# Patient Record
Sex: FEMALE | Race: WHITE | Employment: OTHER | URBAN - METROPOLITAN AREA
[De-identification: names, ages, dates, MRNs, and addresses within clinical notes are randomized per-mention and may not be internally consistent; named-entity substitution may affect disease eponyms.]

---

## 2017-06-28 ENCOUNTER — TELEPHONE (OUTPATIENT)
Dept: FAMILY MEDICINE CLINIC | Age: 52
End: 2017-06-28

## 2017-06-28 DIAGNOSIS — M54.17 THORACIC AND LUMBOSACRAL NEURITIS: Primary | ICD-10-CM

## 2017-06-28 DIAGNOSIS — M54.14 THORACIC AND LUMBOSACRAL NEURITIS: Primary | ICD-10-CM

## 2017-06-30 ENCOUNTER — TELEPHONE (OUTPATIENT)
Dept: INTERNAL MEDICINE CLINIC | Age: 52
End: 2017-06-30

## 2017-08-15 ENCOUNTER — OFFICE VISIT (OUTPATIENT)
Dept: RHEUMATOLOGY | Age: 52
End: 2017-08-15

## 2017-08-15 ENCOUNTER — HOSPITAL ENCOUNTER (OUTPATIENT)
Dept: NON INVASIVE DIAGNOSTICS | Age: 52
Discharge: HOME | End: 2017-08-15
Attending: INTERNAL MEDICINE | Admitting: INTERNAL MEDICINE

## 2017-08-15 VITALS
SYSTOLIC BLOOD PRESSURE: 122 MMHG | HEIGHT: 60 IN | DIASTOLIC BLOOD PRESSURE: 79 MMHG | HEART RATE: 72 BPM | OXYGEN SATURATION: 94 %

## 2017-08-15 DIAGNOSIS — M79.18 MUSCULOSKELETAL PAIN: ICD-10-CM

## 2017-08-15 DIAGNOSIS — M25.50 POLYARTHRALGIA: Primary | ICD-10-CM

## 2017-08-15 DIAGNOSIS — M25.50 POLYARTHRALGIA: ICD-10-CM

## 2017-08-15 LAB
A/G RATIO: 1.3 (ref 1.1–2.2)
ALBUMIN SERPL-MCNC: 4.4 G/DL (ref 3.4–5)
ALP BLD-CCNC: 116 U/L (ref 40–129)
ALT SERPL-CCNC: 35 U/L (ref 10–40)
ANION GAP SERPL CALCULATED.3IONS-SCNC: 16 MMOL/L (ref 3–16)
AST SERPL-CCNC: 25 U/L (ref 15–37)
BASOPHILS ABSOLUTE: 0.1 K/UL (ref 0–0.2)
BASOPHILS RELATIVE PERCENT: 1.9 %
BILIRUB SERPL-MCNC: 0.3 MG/DL (ref 0–1)
BUN BLDV-MCNC: 9 MG/DL (ref 7–20)
C-REACTIVE PROTEIN: 6.6 MG/L (ref 0–5.1)
CALCIUM SERPL-MCNC: 9.4 MG/DL (ref 8.3–10.6)
CHLORIDE BLD-SCNC: 103 MMOL/L (ref 99–110)
CO2: 24 MMOL/L (ref 21–32)
CREAT SERPL-MCNC: 0.6 MG/DL (ref 0.6–1.1)
EOSINOPHILS ABSOLUTE: 0.2 K/UL (ref 0–0.6)
EOSINOPHILS RELATIVE PERCENT: 3.4 %
GFR AFRICAN AMERICAN: >60
GFR NON-AFRICAN AMERICAN: >60
GLOBULIN: 3.3 G/DL
GLUCOSE BLD-MCNC: 103 MG/DL (ref 70–99)
HCT VFR BLD CALC: 45.5 % (ref 36–48)
HEMOGLOBIN: 15.4 G/DL (ref 12–16)
HEPATITIS B CORE IGM ANTIBODY: NORMAL
HEPATITIS B SURFACE ANTIGEN INTERPRETATION: NORMAL
HEPATITIS C ANTIBODY INTERPRETATION: NORMAL
LYMPHOCYTES ABSOLUTE: 1.9 K/UL (ref 1–5.1)
LYMPHOCYTES RELATIVE PERCENT: 36.4 %
MCH RBC QN AUTO: 30.1 PG (ref 26–34)
MCHC RBC AUTO-ENTMCNC: 33.8 G/DL (ref 31–36)
MCV RBC AUTO: 89 FL (ref 80–100)
MONOCYTES ABSOLUTE: 0.5 K/UL (ref 0–1.3)
MONOCYTES RELATIVE PERCENT: 9.2 %
NEUTROPHILS ABSOLUTE: 2.5 K/UL (ref 1.7–7.7)
NEUTROPHILS RELATIVE PERCENT: 49.1 %
PDW BLD-RTO: 12.9 % (ref 12.4–15.4)
PLATELET # BLD: 325 K/UL (ref 135–450)
PMV BLD AUTO: 8.3 FL (ref 5–10.5)
POTASSIUM SERPL-SCNC: 4.2 MMOL/L (ref 3.5–5.1)
RBC # BLD: 5.11 M/UL (ref 4–5.2)
RHEUMATOID FACTOR: 165 IU/ML
SODIUM BLD-SCNC: 143 MMOL/L (ref 136–145)
TOTAL CK: 97 U/L (ref 26–192)
TOTAL PROTEIN: 7.7 G/DL (ref 6.4–8.2)
TSH REFLEX FT4: 1.66 UIU/ML (ref 0.27–4.2)
VITAMIN B-12: 437 PG/ML (ref 211–911)
VITAMIN D 25-HYDROXY: 37.9 NG/ML
WBC # BLD: 5.1 K/UL (ref 4–11)

## 2017-08-15 PROCEDURE — 99244 OFF/OP CNSLTJ NEW/EST MOD 40: CPT | Performed by: INTERNAL MEDICINE

## 2017-08-15 RX ORDER — PREDNISONE 1 MG/1
5 TABLET ORAL 2 TIMES DAILY
Qty: 42 TABLET | Refills: 0 | Status: SHIPPED | OUTPATIENT
Start: 2017-08-15 | End: 2017-09-05

## 2017-08-15 ASSESSMENT — ENCOUNTER SYMPTOMS
SORE THROAT: 0
PHOTOPHOBIA: 0
STRIDOR: 0
EYE PAIN: 0
EYE REDNESS: 0
GASTROINTESTINAL NEGATIVE: 1
EYE DISCHARGE: 0
SHORTNESS OF BREATH: 1

## 2017-08-17 ENCOUNTER — TELEPHONE (OUTPATIENT)
Dept: INTERNAL MEDICINE CLINIC | Age: 52
End: 2017-08-17

## 2017-08-17 LAB
ANA BY IFA: ABNORMAL
CCP IGG ANTIBODIES: 190 UNITS (ref 0–19)

## 2017-08-21 ENCOUNTER — TELEPHONE (OUTPATIENT)
Dept: RHEUMATOLOGY | Age: 52
End: 2017-08-21

## 2017-09-05 ENCOUNTER — OFFICE VISIT (OUTPATIENT)
Dept: RHEUMATOLOGY | Age: 52
End: 2017-09-05

## 2017-09-05 VITALS
HEIGHT: 60 IN | BODY MASS INDEX: 32.79 KG/M2 | DIASTOLIC BLOOD PRESSURE: 88 MMHG | WEIGHT: 167 LBS | SYSTOLIC BLOOD PRESSURE: 130 MMHG

## 2017-09-05 DIAGNOSIS — M05.79 RHEUMATOID ARTHRITIS INVOLVING MULTIPLE SITES WITH POSITIVE RHEUMATOID FACTOR (HCC): Primary | ICD-10-CM

## 2017-09-05 LAB
ALT SERPL-CCNC: 37 U/L (ref 10–40)
AST SERPL-CCNC: 28 U/L (ref 15–37)
BASOPHILS ABSOLUTE: 0.1 K/UL (ref 0–0.2)
BASOPHILS RELATIVE PERCENT: 1.5 %
CREAT SERPL-MCNC: 0.6 MG/DL (ref 0.6–1.1)
EOSINOPHILS ABSOLUTE: 0.2 K/UL (ref 0–0.6)
EOSINOPHILS RELATIVE PERCENT: 3.8 %
GFR AFRICAN AMERICAN: >60
GFR NON-AFRICAN AMERICAN: >60
HCT VFR BLD CALC: 42.4 % (ref 36–48)
HEMOGLOBIN: 14.3 G/DL (ref 12–16)
LYMPHOCYTES ABSOLUTE: 2.1 K/UL (ref 1–5.1)
LYMPHOCYTES RELATIVE PERCENT: 45 %
MCH RBC QN AUTO: 30.2 PG (ref 26–34)
MCHC RBC AUTO-ENTMCNC: 33.7 G/DL (ref 31–36)
MCV RBC AUTO: 89.6 FL (ref 80–100)
MONOCYTES ABSOLUTE: 0.4 K/UL (ref 0–1.3)
MONOCYTES RELATIVE PERCENT: 8.1 %
NEUTROPHILS ABSOLUTE: 1.9 K/UL (ref 1.7–7.7)
NEUTROPHILS RELATIVE PERCENT: 41.6 %
PDW BLD-RTO: 13.1 % (ref 12.4–15.4)
PLATELET # BLD: 298 K/UL (ref 135–450)
PLATELET SLIDE REVIEW: ADEQUATE
PMV BLD AUTO: 9.1 FL (ref 5–10.5)
RBC # BLD: 4.73 M/UL (ref 4–5.2)
WBC # BLD: 4.7 K/UL (ref 4–11)

## 2017-09-05 PROCEDURE — 99214 OFFICE O/P EST MOD 30 MIN: CPT | Performed by: INTERNAL MEDICINE

## 2017-09-05 RX ORDER — FOLIC ACID 1 MG/1
1 TABLET ORAL DAILY
Qty: 30 TABLET | Refills: 5 | Status: SHIPPED | OUTPATIENT
Start: 2017-09-05 | End: 2017-11-17 | Stop reason: ALTCHOICE

## 2017-09-05 RX ORDER — METHYLPREDNISOLONE 4 MG/1
TABLET ORAL
Qty: 75 TABLET | Refills: 0 | Status: SHIPPED | OUTPATIENT
Start: 2017-09-05 | End: 2017-11-17 | Stop reason: ALTCHOICE

## 2017-09-05 ASSESSMENT — ENCOUNTER SYMPTOMS
STRIDOR: 0
GASTROINTESTINAL NEGATIVE: 1
EYE PAIN: 0
EYE REDNESS: 0
PHOTOPHOBIA: 0
EYE DISCHARGE: 0
SORE THROAT: 0
SHORTNESS OF BREATH: 1

## 2017-11-17 ENCOUNTER — OFFICE VISIT (OUTPATIENT)
Dept: FAMILY MEDICINE CLINIC | Age: 52
End: 2017-11-17

## 2017-11-17 VITALS
HEIGHT: 60 IN | TEMPERATURE: 98 F | BODY MASS INDEX: 32.98 KG/M2 | SYSTOLIC BLOOD PRESSURE: 124 MMHG | DIASTOLIC BLOOD PRESSURE: 84 MMHG | WEIGHT: 168 LBS

## 2017-11-17 DIAGNOSIS — J01.10 ACUTE FRONTAL SINUSITIS, RECURRENCE NOT SPECIFIED: Primary | ICD-10-CM

## 2017-11-17 PROCEDURE — 3014F SCREEN MAMMO DOC REV: CPT | Performed by: INTERNAL MEDICINE

## 2017-11-17 PROCEDURE — G8484 FLU IMMUNIZE NO ADMIN: HCPCS | Performed by: INTERNAL MEDICINE

## 2017-11-17 PROCEDURE — 99213 OFFICE O/P EST LOW 20 MIN: CPT | Performed by: INTERNAL MEDICINE

## 2017-11-17 PROCEDURE — G8427 DOCREV CUR MEDS BY ELIG CLIN: HCPCS | Performed by: INTERNAL MEDICINE

## 2017-11-17 PROCEDURE — 3017F COLORECTAL CA SCREEN DOC REV: CPT | Performed by: INTERNAL MEDICINE

## 2017-11-17 PROCEDURE — G8417 CALC BMI ABV UP PARAM F/U: HCPCS | Performed by: INTERNAL MEDICINE

## 2017-11-17 PROCEDURE — 4004F PT TOBACCO SCREEN RCVD TLK: CPT | Performed by: INTERNAL MEDICINE

## 2017-11-17 RX ORDER — AZITHROMYCIN 250 MG/1
TABLET, FILM COATED ORAL
Qty: 1 PACKET | Refills: 0 | Status: SHIPPED | OUTPATIENT
Start: 2017-11-17 | End: 2017-11-27

## 2017-11-17 ASSESSMENT — ENCOUNTER SYMPTOMS
SHORTNESS OF BREATH: 0
COUGH: 1
SINUS PRESSURE: 1
APNEA: 0
RHINORRHEA: 1
ABDOMINAL PAIN: 0

## 2017-11-17 NOTE — PROGRESS NOTES
Subjective:      Patient ID: Brenda Payne is a 47 y.o. female. HPI   Chief Complaint   Patient presents with    URI     patient c/o head congestion, cough- productive at times, tightness in chest x 3 days; slight sore throat. patient denies fever     Brenda Payne is a 47 y.o. female with the following history as recorded in [facility]:  Patient Active Problem List    Diagnosis Date Noted    Pink eye disease of both eyes 2016    Essential hypertension 2016    Sinusitis 2015    Foot pain, bilateral 2015    Memory loss 2015    Mixed anxiety depressive disorder 2014    Thoracic and lumbosacral neuritis 2010     Current Outpatient Prescriptions   Medication Sig Dispense Refill    methylPREDNISolone (MEDROL) 4 MG tablet Take 3 tabs/day for 1 week,2.5 tabs/day for 1 week,2 tab/day for 1 week,1.5 tab/day for 1 week,1 tab/day for 1 week,0.5 tab/day for 1 week 75 tablet 0    methotrexate (RHEUMATREX) 2.5 MG chemo tablet Take 4 pills/week for 2 weeks,  increase to 6 pills/week for 2 weeks and then 8 pills/week thereafter. Take all pills at the same time 40 tablet 1    folic acid (FOLVITE) 1 MG tablet Take 1 tablet by mouth daily 30 tablet 5    ibuprofen (ADVIL;MOTRIN) 600 MG tablet Take 1 tablet by mouth every 6 hours as needed for Pain 60 tablet 0     No current facility-administered medications for this visit. Allergies: Fluoxetine; Hydrocodone-acetaminophen; Penicillins; Serotonin reuptake inhibitors (ssris);  Venlafaxine; Clindamycin/lincomycin; and Keflex [cephalexin]  Past Medical History:   Diagnosis Date    Rheumatoid arthritis ([facility] [address]      Past Surgical History:   Procedure Laterality Date     SECTION      GALLBLADDER SURGERY      HYSTERECTOMY      TUBAL LIGATION       Family History   Problem Relation Age of Onset    High Blood Pressure Mother     Other Mother      thyroid    Heart Disease Mother      Social History   Substance Use Topics    Smoking status: Current Every Day Smoker     Packs/day: 0.50     Years: 30.00     Types: Cigarettes    Smokeless tobacco: Never Used    Alcohol use No       Review of Systems   HENT: Positive for congestion, postnasal drip, rhinorrhea and sinus pressure. Eyes: Negative for visual disturbance. Respiratory: Positive for cough. Negative for apnea and shortness of breath. Cardiovascular: Negative for chest pain and palpitations. Gastrointestinal: Negative for abdominal pain. Genitourinary: Negative for dysuria and frequency. Objective:   Physical Exam   Constitutional: She is oriented to person, place, and time. She appears well-developed. HENT:   Edema bilat. nasal turbinates with drainage . Cardiovascular: Normal rate. No murmur heard. Pulmonary/Chest: Effort normal and breath sounds normal. No respiratory distress. She has no wheezes. Abdominal: She exhibits no distension. There is no tenderness. Neurological: She is alert and oriented to person, place, and time.        Assessment:      sinusitis      Plan:      [facility]

## 2018-09-18 ENCOUNTER — HOSPITAL ENCOUNTER (OUTPATIENT)
Dept: NON INVASIVE DIAGNOSTICS | Age: 53
Discharge: HOME | End: 2018-09-18

## 2018-09-18 DIAGNOSIS — R06.02 SHORTNESS OF BREATH: ICD-10-CM

## 2018-11-30 ENCOUNTER — HOSPITAL ENCOUNTER (OUTPATIENT)
Dept: GENERAL RADIOLOGY | Age: 53
Discharge: HOME | End: 2018-11-30
Payer: COMMERCIAL

## 2018-11-30 ENCOUNTER — HOSPITAL ENCOUNTER (OUTPATIENT)
Age: 53
Discharge: HOME | End: 2018-11-30
Payer: COMMERCIAL

## 2018-11-30 DIAGNOSIS — R59.0 BILATERAL HILAR ADENOPATHY SYNDROME: ICD-10-CM

## 2018-11-30 PROCEDURE — 71046 X-RAY EXAM CHEST 2 VIEWS: CPT

## 2019-02-13 ENCOUNTER — HOSPITAL ENCOUNTER (OUTPATIENT)
Dept: NON INVASIVE DIAGNOSTICS | Age: 54
Discharge: HOME | End: 2019-02-13
Payer: COMMERCIAL

## 2019-02-13 LAB
LV EF: 63 %
LVEF MODALITY: NORMAL

## 2019-02-13 PROCEDURE — 93306 TTE W/DOPPLER COMPLETE: CPT

## 2019-05-01 ENCOUNTER — HOSPITAL ENCOUNTER (OUTPATIENT)
Dept: CT IMAGING | Age: 54
Discharge: HOME | End: 2019-05-01
Payer: MEDICARE

## 2019-05-01 DIAGNOSIS — N20.0 CALCULUS OF KIDNEY: ICD-10-CM

## 2019-05-01 PROCEDURE — 74176 CT ABD & PELVIS W/O CONTRAST: CPT

## 2019-07-31 ENCOUNTER — HOSPITAL ENCOUNTER (EMERGENCY)
Age: 54
Discharge: HOME | End: 2019-07-31
Attending: EMERGENCY MEDICINE
Payer: MEDICARE

## 2019-07-31 ENCOUNTER — APPOINTMENT (OUTPATIENT)
Dept: GENERAL RADIOLOGY | Age: 54
End: 2019-07-31
Payer: MEDICARE

## 2019-07-31 VITALS
BODY MASS INDEX: 39.69 KG/M2 | DIASTOLIC BLOOD PRESSURE: 83 MMHG | OXYGEN SATURATION: 100 % | RESPIRATION RATE: 20 BRPM | SYSTOLIC BLOOD PRESSURE: 134 MMHG | TEMPERATURE: 98 F | HEART RATE: 95 BPM | WEIGHT: 202.16 LBS | HEIGHT: 60 IN

## 2019-07-31 DIAGNOSIS — S20.211A CONTUSION OF RIGHT CHEST WALL, INITIAL ENCOUNTER: Primary | ICD-10-CM

## 2019-07-31 PROCEDURE — 99283 EMERGENCY DEPT VISIT LOW MDM: CPT

## 2019-07-31 PROCEDURE — 71101 X-RAY EXAM UNILAT RIBS/CHEST: CPT

## 2019-07-31 RX ORDER — HYDROCHLOROTHIAZIDE 25 MG/1
25 TABLET ORAL
COMMUNITY
Start: 2019-01-18 | End: 2021-03-07

## 2019-07-31 RX ORDER — FLUTICASONE PROPIONATE 50 MCG
2 SPRAY, SUSPENSION (ML) NASAL DAILY PRN
COMMUNITY
Start: 2018-10-23

## 2019-07-31 RX ORDER — TIOTROPIUM BROMIDE INHALATION SPRAY 3.12 UG/1
2 SPRAY, METERED RESPIRATORY (INHALATION) DAILY PRN
Refills: 0 | COMMUNITY
Start: 2019-05-31

## 2019-07-31 RX ORDER — AMLODIPINE BESYLATE 5 MG/1
5 TABLET ORAL
COMMUNITY
Start: 2019-01-18 | End: 2021-03-07

## 2019-07-31 RX ORDER — ALBUTEROL SULFATE 90 UG/1
2 AEROSOL, METERED RESPIRATORY (INHALATION) EVERY 4 HOURS PRN
COMMUNITY
Start: 2018-05-20

## 2019-07-31 RX ORDER — OXYCODONE HYDROCHLORIDE 5 MG/1
5 TABLET ORAL EVERY 6 HOURS PRN
Qty: 12 TABLET | Refills: 0 | Status: SHIPPED | OUTPATIENT
Start: 2019-07-31 | End: 2019-08-03

## 2019-07-31 RX ORDER — HYDROXYZINE PAMOATE 25 MG/1
25 CAPSULE ORAL
COMMUNITY
Start: 2018-04-24 | End: 2021-03-07

## 2019-07-31 ASSESSMENT — PAIN DESCRIPTION - LOCATION
LOCATION: RIB CAGE
LOCATION: RIB CAGE

## 2019-07-31 ASSESSMENT — PAIN - FUNCTIONAL ASSESSMENT
PAIN_FUNCTIONAL_ASSESSMENT: PREVENTS OR INTERFERES SOME ACTIVE ACTIVITIES AND ADLS
PAIN_FUNCTIONAL_ASSESSMENT: PREVENTS OR INTERFERES SOME ACTIVE ACTIVITIES AND ADLS

## 2019-07-31 ASSESSMENT — PAIN DESCRIPTION - ORIENTATION
ORIENTATION: RIGHT
ORIENTATION: RIGHT

## 2019-07-31 ASSESSMENT — PAIN SCALES - GENERAL
PAINLEVEL_OUTOF10: 8
PAINLEVEL_OUTOF10: 9

## 2019-07-31 ASSESSMENT — PAIN DESCRIPTION - ONSET
ONSET: AWAKENED FROM SLEEP
ONSET: AWAKENED FROM SLEEP

## 2019-07-31 ASSESSMENT — PAIN DESCRIPTION - FREQUENCY
FREQUENCY: CONTINUOUS
FREQUENCY: CONTINUOUS

## 2019-07-31 ASSESSMENT — PAIN DESCRIPTION - PAIN TYPE
TYPE: ACUTE PAIN
TYPE: ACUTE PAIN

## 2019-07-31 ASSESSMENT — PAIN DESCRIPTION - DESCRIPTORS
DESCRIPTORS: ACHING
DESCRIPTORS: ACHING

## 2019-07-31 NOTE — ED PROVIDER NOTES
[facility]  eMERGENCY dEPARTMENT eNCOUnter      Pt Name: Constantine Myers  MRN: [id number]  [place] 1965  Date of evaluation: 7/31/2019  Provider: Sheri Robles MD    [address] [place] [address]       Chief Complaint   Patient presents with    Chest Pain     rib pain right side after a fall on Sunday at [employer]. Patient stated tried to catch herself and hurt right wrist, but wrist feels better just under breast and rib area. No LOC         HISTORY OF PRESENT ILLNESS  (Location/Symptom, Timing/Onset, Context/Setting, Quality, Duration, Modifying Factors, Severity.)   Constantine Myers is a 49 y.o. female who presents to the emergency department running of right rib pain after a fall on Sunday. She fell on the chest steps at [employer]. She did not hit her head. She denies any neck or back pain. She states the pain is primarily underneath her right breast.  It seemed to be worse today. She had an abrasion on her right knee. She had some soreness in her left leg. These have resolved. No head injury. No loss of consciousness. No shortness of breath or hemoptysis. No abdominal pain. Nursing Notes were reviewed and I agree. REVIEW OF SYSTEMS    (2-9 systems for level 4, 10 or more for level 5)     HEENT no head or facial injury. Cardiovascular: Right anterior rib pain beneath her right breast.  Worse with movement breathing. Pulmonary: No shortness of breath hemoptysis or cough. GI: No abdominal pain nausea vomiting. Musculoskeletal: Some left lower leg pain initially after the fall but none at this time. No back pain. Skin: An abrasion over her right knee which she states is resolved. Neuro: No head injury. No extremity numbness weakness or paresthesias. Skin: Abrasion to right knee which is resolved. Except as noted above the remainder of the review of systems was reviewed and negative.        PAST MEDICAL HISTORY         Diagnosis Date    COPD (chronic obstructive erythema. No facial trauma. Neck: Supple, nontender, no adenopathy. Chest wall: Diffuse right mid anterior lateral chest wall tenderness without crepitance. Heart: Regular rate and rhythm. No murmurs or gallops noted. Lungs: Breath sounds equal bilaterally and clear. Abdomen: Obese, soft, nontender. Musculoskeletal: No upper or lower extremity bony tenderness. No joint swelling. No back tenderness. Skin: No bruising. A minimal healing abrasion over the anterior right knee without erythema. Neuro: Awake, alert, oriented x3. Symmetrical reactive pupils. Intact extraocular movements. No facial asymmetry. Midline tongue. Symmetrical motor function. Normal gait. DIAGNOSTIC RESULTS     RADIOLOGY:   Non-plain film images such as CT, Ultrasound and MRI are read by the radiologist. Plain radiographic images are visualized and preliminarily interpreted by Emily Quintanilla MD with the below findings:      Interpretation per the Radiologist below, if available at the time of this note:    XR RIBS RIGHT INCLUDE CHEST (MIN 3 VIEWS)   Preliminary Result   No acute cardiopulmonary process. No displaced right rib fracture. LABS:  Labs Reviewed - No data to display    All other labs were within normal range or not returned as of this dictation. EMERGENCY DEPARTMENT COURSE and DIFFERENTIAL DIAGNOSIS/MDM:   Vitals:    Vitals:    07/31/19 1452   BP: 134/83   Pulse: 95   Resp: 20   Temp: 98 °F (36.7 °C)   TempSrc: Oral   SpO2: 100%   Weight: 202 lb 2.6 oz (91.7 kg)   Height: 5' (1.524 m)       The patient sustained a fall on Sunday and injured her right rib area. She is had increasing pain. No shortness of breath or hemoptysis. She has no crepitance on exam.  No bruising. Her tenderness is localized to her right anterior mid chest in the area of her breasts. Chest x-ray shows no evidence of rib fracture, pulmonary contusion, pneumothorax.   She is limited due to her liver disease and inability to take acetaminophen and NSAIDs. I am going to provide short-term pain control in the form of oxycodone for 3 days. I am going to have her follow-up with her primary care physician. X-ray results, treatment plan, and follow-up were discussed with the patient. She understands treatment plan and will follow-up as discussed. Controlled Substance Monitoring:    Acute and Chronic Pain Monitoring:   RX Monitoring 7/31/2019   Periodic Controlled Substance Monitoring Possible medication side effects, risk of tolerance/dependence & alternative treatments discussed. ;No signs of potential drug abuse or diversion identified. PROCEDURES:  None    FINAL IMPRESSION      1. Contusion of right chest wall, initial encounter          DISPOSITION/PLAN   DISPOSITION Decision To Discharge 07/31/2019 03:26:20 PM      PATIENT REFERRED TO:  JEB Howard - CNP    [facility]  [phone]    In 5 days        DISCHARGE MEDICATIONS:  New Prescriptions    OXYCODONE (ROXICODONE) 5 MG IMMEDIATE RELEASE TABLET    Take 1 tablet by mouth every 6 hours as needed for Pain for up to 3 days. Intended supply: 3 days.  Take lowest dose possible to manage pain       (Please note that portions of this note were completed with a voice recognition program.  Efforts were made to edit the dictations but occasionally words are mis-transcribed.)    Siddharth Flores MD  Attending Emergency Physician        Malaika Josue MD  07/31/19 1994

## 2019-07-31 NOTE — ED NOTES
Discharge instructions reviewed. Patient verbalized understanding. Patient given a incentive spirometer to help with deep breaths.        Lili Santoyo RN  07/31/19 8680

## 2019-08-29 ENCOUNTER — HOSPITAL ENCOUNTER (EMERGENCY)
Age: 54
Discharge: HOME | End: 2019-08-29
Attending: EMERGENCY MEDICINE
Payer: MEDICARE

## 2019-08-29 VITALS
BODY MASS INDEX: 40.21 KG/M2 | OXYGEN SATURATION: 95 % | HEART RATE: 85 BPM | TEMPERATURE: 97.4 F | WEIGHT: 204.81 LBS | HEIGHT: 60 IN | RESPIRATION RATE: 18 BRPM | SYSTOLIC BLOOD PRESSURE: 132 MMHG | DIASTOLIC BLOOD PRESSURE: 92 MMHG

## 2019-08-29 DIAGNOSIS — M77.8 TENDONITIS OF BOTH WRISTS: Primary | ICD-10-CM

## 2019-08-29 PROCEDURE — 99283 EMERGENCY DEPT VISIT LOW MDM: CPT

## 2019-08-29 RX ORDER — PREDNISONE 20 MG/1
TABLET ORAL
Qty: 15 TABLET | Refills: 0 | Status: SHIPPED | OUTPATIENT
Start: 2019-08-29 | End: 2021-03-07

## 2019-08-29 ASSESSMENT — ENCOUNTER SYMPTOMS
VOMITING: 0
ABDOMINAL PAIN: 0
DIARRHEA: 0
NAUSEA: 0
EYE DISCHARGE: 0
SORE THROAT: 0
RHINORRHEA: 0
BACK PAIN: 0
WHEEZING: 0
COUGH: 0
SHORTNESS OF BREATH: 0
EYE PAIN: 0

## 2019-08-29 ASSESSMENT — PAIN SCALES - GENERAL
PAINLEVEL_OUTOF10: 8
PAINLEVEL_OUTOF10: 8
PAINLEVEL_OUTOF10: 10

## 2019-08-29 ASSESSMENT — PAIN DESCRIPTION - DESCRIPTORS
DESCRIPTORS: CONSTANT
DESCRIPTORS: CONSTANT

## 2019-08-29 ASSESSMENT — PAIN DESCRIPTION - PROGRESSION
CLINICAL_PROGRESSION: GRADUALLY WORSENING
CLINICAL_PROGRESSION: GRADUALLY IMPROVING

## 2019-08-29 ASSESSMENT — PAIN DESCRIPTION - ORIENTATION
ORIENTATION: LEFT;RIGHT
ORIENTATION: LEFT;RIGHT

## 2019-08-29 ASSESSMENT — PAIN DESCRIPTION - PAIN TYPE: TYPE: ACUTE PAIN

## 2019-08-29 ASSESSMENT — PAIN DESCRIPTION - FREQUENCY
FREQUENCY: CONTINUOUS
FREQUENCY: CONTINUOUS

## 2019-08-29 ASSESSMENT — PAIN SCALES - WONG BAKER: WONGBAKER_NUMERICALRESPONSE: 0

## 2019-08-29 ASSESSMENT — PAIN DESCRIPTION - LOCATION
LOCATION: HAND
LOCATION: HAND

## 2019-08-29 NOTE — ED PROVIDER NOTES
[facility]  eMERGENCY dEPARTMENT eNCOUnter        Pt Name: Linda Jonas  MRN: [id number]  [place] 1965  Date of evaluation: 8/29/2019  Provider: Alecia Page MD  PCP: JEB Tyson - CNP      CHIEF COMPLAINT       Chief Complaint   Patient presents with    Hand Pain     Bilateral hand pain and swelling for over one week. History of RA. HISTORY OFPRESENT ILLNESS   (Location/Symptom, Timing/Onset, Context/Setting, Quality, Duration, Modifying Factors,Severity)  Note limiting factors. Linda Jonas is a 49 y.o. female       Location/Symptom: Bilateral hand pain and swelling  Timing/Onset: 1 week  Context/Setting: Patient has a history of rheumatoid arthritis. She had been on sulfasalazine and did quite well with that but developed nonalcoholic steatohepatitis so that had to be stopped. She is currently on Humira but really has been helping her. Quality: Burning and sharp  Duration: Constant  Modifying Factors: Doing anything with her hands or wrist.  In fact her  had to help her get dressed. Severity: 10 out of 10    Nursing Noteswere all reviewed and agreed with or any disagreements were addressed  in the HPI. REVIEW OF SYSTEMS    (2-9 systems for level 4, 10 or more for level 5)     Review of Systems   Constitutional: Negative for chills, fatigue and fever. HENT: Negative for ear pain, rhinorrhea and sore throat. Eyes: Negative for pain, discharge and visual disturbance. Respiratory: Negative for cough, shortness of breath and wheezing. Cardiovascular: Negative for chest pain, palpitations and leg swelling. Gastrointestinal: Negative for abdominal pain, diarrhea, nausea and vomiting. Genitourinary: Negative for difficulty urinating, dysuria, pelvic pain and vaginal discharge. Musculoskeletal: Positive for arthralgias. Negative for back pain, joint swelling and neck pain. Positive per HPI   Skin: Negative for rash. Allergic/Immunologic: Negative for environmental allergies. Neurological: Negative for dizziness, seizures, syncope and headaches. Hematological: Negative for adenopathy. Psychiatric/Behavioral: Negative for dysphoric mood and suicidal ideas. The patient is not nervous/anxious. PAST MEDICAL HISTORY     Past Medical History:   Diagnosis Date    COPD (chronic obstructive pulmonary disease) ([facility] [address]     GARCIA (nonalcoholic steatohepatitis)     [facility] ([facility]          SURGICAL HISTORY     Past Surgical History:   Procedure Laterality Date     SECTION      GALLBLADDER SURGERY      HYSTERECTOMY      TUBAL LIGATION           CURRENTMEDICATIONS       Previous Medications    ADALIMUMAB 40 MG/0.4ML PNKT    Inject 40 mg into the skin    ALBUTEROL SULFATE  (90 BASE) MCG/ACT INHALER        AMLODIPINE (NORVASC) 5 MG TABLET    Take 5 mg by mouth    FLUTICASONE (FLONASE) 50 MCG/ACT NASAL SPRAY    SPRAY 2 SPRAY(S) EVERY DAY BY INTRANASAL ROUTE AS NEEDED. HYDROCHLOROTHIAZIDE (HYDRODIURIL) 25 MG TABLET    Take 25 mg by mouth    HYDROXYZINE (VISTARIL) 25 MG CAPSULE    Take 25 mg by mouth    IBUPROFEN (ADVIL;MOTRIN) 600 MG TABLET    Take 1 tablet by mouth every 6 hours as needed for Pain    OMEGA-3 FATTY ACIDS (FISH OIL PO)    Take by mouth    SPIRIVA RESPIMAT 2.5 MCG/ACT AERS INHALER    TAKE 2 PUFFS BY MOUTH EVERY DAY       ALLERGIES     Fluoxetine; Hydrocodone-acetaminophen; Penicillins; Serotonin reuptake inhibitors (ssris);  Venlafaxine; Clindamycin/lincomycin; and Keflex [cephalexin]    FAMILY HISTORY       Family History   Problem Relation Age of Onset    High Blood Pressure Mother     Other Mother         thyroid    Heart Disease Mother           SOCIAL HISTORY       Social History     Socioeconomic History    Marital status:      Spouse name: None    Number of children: None    Years of education: None    Highest education level: None   Occupational History    None

## 2020-11-17 ENCOUNTER — HOSPITAL ENCOUNTER (OUTPATIENT)
Dept: PHYSICAL THERAPY | Age: 55
Setting detail: THERAPIES SERIES
Discharge: HOME | End: 2020-11-17
Payer: MEDICARE

## 2020-11-17 NOTE — FLOWSHEET NOTE
Physical Therapy  Cancellation/No-show Note  Patient Name:  Ezekiel Pretty  :  1965   Date:  2020  Cancelled visits to date: 1  No-shows to date: 0    For today's appointment patient:  [x]  Cancelled  [x]  Rescheduled appointment  []  No-show     Reason given by patient:  [x]  Patient ill  []  Conflicting appointment  []  No transportation    []  Conflict with work  []  No reason given  []  Other:     Comments:  20:  Patient called, cancelled PT evaluation this am due to not feeling well. Patient rescheduled.     Electronically signed by:  Beltran Fermin, [address] [place] [address],[facility]

## 2020-11-19 ENCOUNTER — HOSPITAL ENCOUNTER (OUTPATIENT)
Dept: PHYSICAL THERAPY | Age: 55
Setting detail: THERAPIES SERIES
Discharge: HOME | End: 2020-11-19
Payer: MEDICARE

## 2020-11-25 ENCOUNTER — HOSPITAL ENCOUNTER (OUTPATIENT)
Dept: GENERAL RADIOLOGY | Age: 55
Discharge: HOME | End: 2020-11-25
Payer: MEDICARE

## 2020-11-25 ENCOUNTER — HOSPITAL ENCOUNTER (OUTPATIENT)
Age: 55
Discharge: HOME | End: 2020-11-25
Payer: MEDICARE

## 2020-11-25 PROCEDURE — 74018 RADEX ABDOMEN 1 VIEW: CPT

## 2021-01-15 ENCOUNTER — HOSPITAL ENCOUNTER (OUTPATIENT)
Dept: PHYSICAL THERAPY | Age: 56
Setting detail: THERAPIES SERIES
Discharge: HOME | End: 2021-01-15
Payer: MEDICARE

## 2021-01-15 PROCEDURE — 97110 THERAPEUTIC EXERCISES: CPT

## 2021-01-15 PROCEDURE — 97161 PT EVAL LOW COMPLEX 20 MIN: CPT

## 2021-01-15 NOTE — FLOWSHEET NOTE
[facility] - Outpatient Rehabilitation and Therapy, [place] [address]  Phone: [phone]   Fax:     [phone]      Physical Therapy Treatment Note/ Progress Report:     Date:  1/15/2021    Patient Name:  Megan Bradford    :  1965  MRN: [id number]    Pertinent Medical History:     Medical/Treatment Diagnosis Information:  · Diagnosis: Osteoarthris of B Knees M17.9  · Treatment Diagnosis: Decreased functional mobility    Insurance/Certification information:  PT Insurance Information: [employer], allowed 30 PT/OT/ST visits per calendar year, per Shakeel White Ref #[id number]  Physician Information:  Referring Practitioner: Mesha Brody MD  Plan of care signed (Y/N): faxed on 1/15/21    Date of Patient follow up with Physician:      Progress Report: []  Yes  [x]  No     Date Range for reporting period:  Beginnin/15/21  Ending:      Progress report due (10 Rx/or 30 days whichever is less):     Recertification due (POC duration/ or 90 days whichever is less):      Visit # POC/Insurance Allowable Auth Needed   1 12 []Yes   []No     Latex Allergy:  [x]NO      []YES  Preferred Language for Healthcare:   [x]English       []other:  Functional Scale: WOMAC = 70 raw    Date assessed: at al    Pain level:  5-8/10 B knees     History of Injury:  Pt stated she was diagnosed with OA and her \"RA doctor did x-rays back in [facility]" and \"she said this is mild. \"  Pt cannot kneel, pt has pain with walking up steps, sitting too long, sometimes walking causes pain. Pt stated her knees \"feel like they'll lock up when I sit too long. \"  Pt had 2 PT evaluation schedule late  which pt cancelled related to pancreatitis.       SUBJECTIVE:  See eval    OBJECTIVE:   Observation:    Test measurements:    Posture: flexed knees, R LE ER     Functional Mobility/Transfers: sit to stand with use of UE     Palpation: NT     Bandages/Dressings/Incisions: NA     Gait: (include devices/WB status) decreased L arm swing, Trendelenburg  Stair climbin rails: ascended with L LE first each step, descended with R LE first each step with body rotated toward the L     ROM LEFT RIGHT   Knee ext 0 0   Knee Flex 110 122   Ankle PF [facility] WFL   Ankle DF 5 5   Ankle In [facility]   Ankle Ev [facility] WFL   Strength  LEFT RIGHT   HIP Flexors 4/5 4/5   HIP Abductors 4/5 4-/5   HIP ADD 4+/5 4/5   Hip ER 4/5 4/5   Knee EXT (quad) 4/5 4+/5   Knee Flex (HS) 5/5 5/5   Ankle DF 5/5 5/5   Ankle PF 5/5 5/5   Ankle Inv 5/ 5/5   Ankle EV 5/5 5/5         RESTRICTIONS/PRECAUTIONS: RA    Exercises/Interventions:     Therapeutic Ex (09475)   Min: Reps/Resistance Notes/CUES   Nu Step 5' Seat/arm 6   Hamstrings stretch  Gastroc incline stretch     JACOBY                         Manual Intervention (14533)  Min:     Knee PROM     Hip AAROM ABD/Flex/IR/ER     Patella Mobs                    NMR re-education (51062)  Min:  CUES NEEDED             Therapeutic Activity (70149)  Min:               Modalities  Min:     IFC with      CP after exercises     MH after exercises            Other Therapeutic Activities: Pt was educated on PT POC, Diagnosis, Prognosis, pathomechanics as well as frequency and duration of scheduling future physical therapy appointments. Time was also taken on this day to answer all patient questions and participation in PT. Reviewed appointment policy in detail with patient and patient verbalized understanding. Home Exercise Program: Patient was instructed in the following for HEP:  seated HS stretch, gastroc towel stretch, quad set, add set, SLR, access code [email or web address]. Patient verbalized/demonstrated understanding and was issued written handout.       Therapeutic Exercise and NMR EXR  [x] (67171) Provided verbal/tactile cueing for activities related to strengthening, flexibility, endurance, ROM for improvements in LE, proximal hip, and core control with self care, mobility, lifting, ambulation.  [] (15442) Provided verbal/tactile cueing for activities related to improving balance, coordination, kinesthetic sense, posture, motor skill, proprioception  to assist with LE, proximal hip, and core control in self care, mobility, lifting, ambulation and eccentric single leg control. NMR and Therapeutic Activities:    [] (48087 or 00631) Provided verbal/tactile cueing for activities related to improving balance, coordination, kinesthetic sense, posture, motor skill, proprioception and motor activation to allow for proper function of core, proximal hip and LE with self care and ADLs and functional mobility.   [] (75661) Gait Re-education- Provided training and instruction to the patient for proper LE, core and proximal hip recruitment and positioning and eccentric body weight control with ambulation re-education including up and down stairs     Home Exercise Program:    [x] (36429) Reviewed/Progressed HEP activities related to strengthening, flexibility, endurance, ROM of core, proximal hip and LE for functional self-care, mobility, lifting and ambulation/stair navigation   [] (46329)Reviewed/Progressed HEP activities related to improving balance, coordination, kinesthetic sense, posture, motor skill, proprioception of core, proximal hip and LE for self care, mobility, lifting, and ambulation/stair navigation      Manual Treatments:  PROM / STM / Oscillations-Mobs:  G-I, II, III, IV (PA's, Inf., Post.)  [] (64045) Provided manual therapy to mobilize LE, proximal hip and/or LS spine soft tissue/joints for the purpose of modulating pain, promoting relaxation,  increasing ROM, reducing/eliminating soft tissue swelling/inflammation/restriction, improving soft tissue extensibility and allowing for proper ROM for normal function with self care, mobility, lifting and ambulation.        Charges:  Timed Code Treatment Minutes: 25   Total Treatment Minutes: 45      [x] EVAL (LOW) 77862 (typically 20 minutes face-to-face)  [] EVAL (MOD) 45214 (typically 30 minutes face-to-face)  [] EVAL (HIGH) 10703 (typically 45 minutes face-to-face)  [] RE-EVAL     [x] RULA(62116) x 2    [] Dry needle 1 or 2 Muscles (48823)  [] NMR (72988) x     [] Dry needle 3+ Muscles (20708)  [] Manual (64407) x     [] Ultrasound (13137) x  [] TA (27185) x     [] Mech Traction (59140)  [] ES(attended) (20206)     [] ES (un) (60582):   [] Vasopump (87242) [] Ionto (04623)   [] Other:    GOALS:  Patient stated goal: \"To be able to function normally\"  []? Progressing: []? Met: []? Not Met: []? Adjusted     Therapist goals for Patient:   Short Term Goals: To be achieved in: 2 weeks  1. Independent in HEP and progression per patient tolerance, in order to prevent re-injury. []? Progressing: []? Met: []? Not Met: []? Adjusted  2. Patient will have a decrease in pain to facilitate improvement in movement, function, and ADLs as indicated by Functional Deficits. []? Progressing: []? Met: []? Not Met: []? Adjusted     Long Term Goals: To be achieved in: 6 weeks  1. Disability index score of 40% or less for the LEFS to assist with reaching prior level of function. []? Progressing: []? Met: []? Not Met: []? Adjusted  2. Patient will demonstrate increased AROM knee flexion to 120 B knees to allow for proper joint functioning as indicated by patients Functional Deficits. []? Progressing: []? Met: []? Not Met: []? Adjusted  3. Patient will demonstrate an increase in Strength to good proximal hip strength and control allow for proper functional mobility as indicated by patients Functional Deficits. []? Progressing: []? Met: []? Not Met: []? Adjusted  4. Patient will return to walk up and down steps without increased symptoms or restriction. []? Progressing: []? Met: []? Not Met: []? Adjusted  5. Patient will be able to play with her grand children. []? Progressing: []? Met: []? Not Met: []?  Adjusted    ASSESSMENT:  See eval    Treatment/Activity Tolerance:  [x] Patient tolerated treatment well [] Patient limited by fatique  [] Patient limited by pain  [] Patient limited by other medical complications  [] Other:     Overall Progression Towards Functional goals/ Treatment Progress Update:  [] Patient is progressing as expected towards functional goals listed. [] Progression is slowed due to complexities/Impairments listed. [] Progression has been slowed due to co-morbidities. [x] Plan just implemented, too soon to assess goals progression <30days   [] Goals require adjustment due to lack of progress  [] Patient is not progressing as expected and requires additional follow up with physician  [] Other    Prognosis for POC: [x] Good [] Fair  [] Poor    Patient requires continued skilled intervention: [x] Yes  [] No        PLAN:   [] Continue per plan of care [] Alter current plan (see comments)  [x] Plan of care initiated [] Hold pending MD visit [] Discharge    Electronically signed by: Ashlie Palma PT, OMT-C, [id number]      Note: If patient does not return for scheduled/recommended follow up visits, this note will serve as a discharge from care along with the most recent update on progress.

## 2021-01-15 NOTE — PROGRESS NOTES
[facility], [facility] [address]   Phone: [phone]   Fax:     [phone]                                                       Physical Therapy Certification    Dear Referring Practitioner: Marcos España MD,    We had the pleasure of evaluating the following patient for physical therapy services at [facility]. A summary of our findings can be found in the initial assessment below. This includes our plan of care. If you have any questions or concerns regarding these findings, please do not hesitate to contact me at the office phone number checked above. Thank you for the referral.       Physician Signature:_______________________________Date:__________________  By signing above (or electronic signature), therapists plan is approved by physician        Patient: Betty Murrieta   : 1965   MRN: [id number]  Referring Physician: Referring Practitioner: Marcos España MD      Evaluation Date: 1/15/2021      Medical Diagnosis Information:  Diagnosis: Osteoarthris of B Knees M17.9   Treatment Diagnosis: Decreased functional mobility                                         Insurance information: PT Insurance Information: [employer], allowed 30 PT/OT/ST visits per calendar year, per Jaú Ref #[id number]     Precautions/ Contra-indications:   Latex Allergy:  [x]NO      []YES  Preferred Language for Healthcare:   [x]English       []other:    C-SSRS Triggered by Intake questionnaire (Past 2 wk assessment ):   [] No, Questionnaire did not trigger screening. [x] Yes, Patient intake triggered C-SSRS Screening      [x] C-SSRS Screening completed  [] PCP notified via Epic     SUBJECTIVE: Pt stated she was diagnosed with OA and her \"RA doctor did x-rays back in [facility]" and \"she said this is mild. \"  Pt cannot kneel, pt has pain with walking up steps, sitting too long, sometimes walking causes pain. Pt stated her knees \"feel like they'll lock up when I sit too long. \"  Pt had 2 PT evaluation schedule late  which pt cancelled related to pancreatitis. Relevant Medical History:RA  Functional Scale/Score: WOMAC = 70 raw    Pain Scale: 5-8/10 anterior B knees  Easing factors: lying flat supine  Provocative factors: walking too long, sitting too long, stairs     Type: [x]Constant   []Intermittent  []Radiating []Localized []other:     Numbness/Tingling: tingling anterior shin, L > R    Occupation/School: disability- \"for all my illnesses\"    Living Status/Prior Level of Function: Independent with ADLs and IADLs    OBJECTIVE:   Posture: flexed knees, R LE ER    Functional Mobility/Transfers: sit to stand with use of UE    Palpation: NT    Bandages/Dressings/Incisions: NA    Gait: (include devices/WB status) decreased L arm swing, Trendelenburg  Stair climbin rails: ascended with L LE first each step, descended with R LE first each step with body rotated toward the L    ROM LEFT RIGHT   HIP Flex     HIP Abd     HIP Ext     HIP IR     HIP ER     Knee ext 0 0   Knee Flex 110 122   Ankle PF WFL WFL   Ankle DF 5 5   Ankle In [facility] WFL   Ankle Ev [facility] WF   Strength  LEFT RIGHT   HIP Flexors 4/5 4/5   HIP Abductors 4/5 4-/5   HIP ADD 4+/5 4/5   Hip ER 4/5 4/5   Knee EXT (quad) 4/5 4+/5   Knee Flex (HS) 5/5 5/5   Ankle DF 5/5 5/5   Ankle PF 5/5 5/5   Ankle Inv 5/5 5/5   Ankle EV 5/5 5/5          Reflexes/Sensation:    [x]Dermatomes/Myotomes intact    [x]Reflexes equal and normal bilaterally   []Other:    Joint mobility:    []Normal    []Hypo   []Hyper    Orthopedic Special Tests:                        [x] Patient history, allergies, meds reviewed. Medical chart reviewed. See intake form. Review Of Systems (ROS):  [x]Performed Review of systems (Integumentary, CardioPulmonary, Neurological) by intake and observation. Intake form has been scanned into medical record.  Patient has been instructed to contact their primary care physician regarding ROS issues if not already being addressed at this time. Co-morbidities/Complexities (which will affect course of rehabilitation):   []None           Arthritic conditions   [x]Rheumatoid arthritis (M05.9)  []Osteoarthritis (M19.91)   Cardiovascular conditions   [x]Hypertension (I10)  []Hyperlipidemia (E78.5)  []Angina pectoris (I20)  []Atherosclerosis (I70)  []CVA Musculoskeletal conditions   []Disc pathology   []Congenital spine pathologies   []Prior surgical intervention  []Osteoporosis (M81.8)  [x]Osteopenia (M85.8)   Endocrine conditions   []Hypothyroid (E03.9)  []Hyperthyroid Gastrointestinal conditions   []Constipation (S13.28)   Metabolic conditions   []Morbid obesity (E66.01)  [x]Diabetes type 1(E10.65) or 2 (E11.65)   [x]Neuropathy (G60.9)- feet     Pulmonary conditions   []Asthma (J45)  []Coughing   [x]COPD (J44.9)   Psychological Disorders  [x]Anxiety (F41.9)  []Depression (F32.9)   []Other:   [x]Other:   [address]  Former smoker x 2 years           Barriers to/and or personal factors that will affect rehab potential:              []Age  []Sex    []Smoker              []Motivation/Lack of Motivation                        [x]Co-Morbidities              []Cognitive Function, education/learning barriers              []Environmental, home barriers              []profession/work barriers  []past PT/medical experience  []other:  Justification:     Falls Risk Assessment (30 days):   [x] Falls Risk assessed and no intervention required.   [] Falls Risk assessed and Patient requires intervention due to being higher risk   TUG score (>12s at risk):     [] Falls education provided, including         ASSESSMENT:   Functional Impairments:     [x]Noted lumbar/proximal hip/LE hypomobility   [x]Decreased LE functional ROM   [x]Decreased core/proximal hip strength and neuromuscular control   [x]Decreased LE functional strength   [x]Reduced balance/proprioceptive control   []other:      Functional Activity Limitations (from functional questionnaire and intake)   []Reduced ability to tolerate prolonged functional positions   [x]Reduced ability or difficulty with changes of positions or transfers between positions   [x]Reduced ability to maintain good posture and demonstrate good body mechanics with sitting, bending, and lifting   [x]Reduced ability to sleep- pain with turning in bed   [] Reduced ability or tolerance with driving and/or computer work   [x]Reduced ability to perform lifting, carrying tasks   [x]Reduced ability to squat   []Reduced ability to forward bend   [x]Reduced ability to ambulate prolonged functional periods/distances/surfaces   [x]Reduced ability to ascend/descend stairs   [x]Reduced ability to run, hop or jump   [x]other: getting out of car     Participation Restrictions   [x]Reduced participation in self care activities   [x]Reduced participation in home management activities   []Reduced participation in work activities   [x]Reduced participation in social activities. []Reduced participation in sport activities. Classification :    []Signs/symptoms consistent with post-surgical status including decreased ROM, strength and function.    []Signs/symptoms consistent with joint sprain/strain   []Signs/symptoms consistent with patella-femoral syndrome   [x]Signs/symptoms consistent with knee OA/hip OA/RA   []Signs/symptoms consistent with internal derangement of knee/Hip   []Signs/symptoms consistent with functional hip weakness/NMR control      [x]Signs/symptoms consistent with tendinitis/tendinosis    []signs/symptoms consistent with pathology which may benefit from Dry needling      []other:      Prognosis/Rehab Potential:      []Excellent   [x]Good    []Fair   []Poor    Tolerance of evaluation/treatment:    []Excellent   [x]Good    []Fair   []Poor    Physical Therapy Evaluation Complexity Justification  [x] A history of Adjusted    Therapist goals for Patient:   Short Term Goals: To be achieved in: 2 weeks  1. Independent in HEP and progression per patient tolerance, in order to prevent re-injury. [] Progressing: [] Met: [] Not Met: [] Adjusted  2. Patient will have a decrease in pain to facilitate improvement in movement, function, and ADLs as indicated by Functional Deficits. [] Progressing: [] Met: [] Not Met: [] Adjusted    Long Term Goals: To be achieved in: 6 weeks  1. Disability index score of 40% or less for the LEFS to assist with reaching prior level of function. [] Progressing: [] Met: [] Not Met: [] Adjusted  2. Patient will demonstrate increased AROM knee flexion to 120 B knees to allow for proper joint functioning as indicated by patients Functional Deficits. [] Progressing: [] Met: [] Not Met: [] Adjusted  3. Patient will demonstrate an increase in Strength to good proximal hip strength and control allow for proper functional mobility as indicated by patients Functional Deficits. [] Progressing: [] Met: [] Not Met: [] Adjusted  4. Patient will return to walk up and down steps without increased symptoms or restriction. [] Progressing: [] Met: [] Not Met: [] Adjusted  5. Patient will be able to play with her grand children. [] Progressing: [] Met: [] Not Met: [] Adjusted     Electronically signed by:  Vasu Chavarria PT , OMT-C, [id number]        Note: If patient does not return for scheduled/recommended follow up visits, this note will serve as a discharge from care along with the most recent update on progress.

## 2021-01-20 ENCOUNTER — HOSPITAL ENCOUNTER (OUTPATIENT)
Dept: PHYSICAL THERAPY | Age: 56
Setting detail: THERAPIES SERIES
Discharge: HOME | End: 2021-01-20
Payer: MEDICARE

## 2021-01-20 PROCEDURE — 97110 THERAPEUTIC EXERCISES: CPT

## 2021-01-20 PROCEDURE — 97140 MANUAL THERAPY 1/> REGIONS: CPT

## 2021-01-20 NOTE — FLOWSHEET NOTE
[facility], [address] [place] [employer] [address]  Phone: [phone]   Fax:     [phone]      Physical Therapy Treatment Note/ Progress Report:     Date:  2021    Patient Name:  Joan Webb    :  1965  MRN: [id number]    Pertinent Medical History:     Medical/Treatment Diagnosis Information:  · Diagnosis: Osteoarthris of B Knees M17.9  · Treatment Diagnosis: Decreased functional mobility    Insurance/Certification information:  PT Insurance Information: [employer] [employer], allowed 30 PT/OT/ST visits per calendar year, per Madeline Rivero Ref #[id number]  Physician Information:  Referring Practitioner: Dandre Peralta MD  Plan of care signed (Y/N): faxed on 1/15/21    Date of Patient follow up with Physician:      Progress Report: []  Yes  [x]  No     Date Range for reporting period:  Beginnin/15/21  Ending:      Progress report due (10 Rx/or 30 days whichever is less):     Recertification due (POC duration/ or 90 days whichever is less):      Visit # POC/Insurance Allowable Auth Needed   2 12 []Yes   []No     Latex Allergy:  [x]NO      []YES  Preferred Language for Healthcare:   [x]English       []other:  Functional Scale: WOMAC = 70 raw    Date assessed: at eval    Pain level:  5-8/10 B knees     History of Injury:  Pt stated she was diagnosed with OA and her \"RA doctor did x-rays back in [facility]" and \"she said this is mild. \"  Pt cannot kneel, pt has pain with walking up steps, sitting too long, sometimes walking causes pain. Pt stated her knees \"feel like they'll lock up when I sit too long. \"  Pt had 2 PT evaluation schedule late  which pt cancelled related to pancreatitis. SUBJECTIVE:  See eval  21 pt has been doing HEP every other day. HEP \"feels good when you do them, stretching them out. \" Pt had soreness following initial PT eval.  Pt had a sleep study last (in particular from healing following bout of pancreatitis) to inform her treating therapist and PT could be modified. Pt did not require surgery for pancreatitis. Home Exercise Program: Patient was instructed in the following for HEP:  seated HS stretch, gastroc towel stretch, quad set, add set, SLR, access code [email or web address]. Patient verbalized/demonstrated understanding and was issued written handout. Therapeutic Exercise and NMR EXR  [x] (57606) Provided verbal/tactile cueing for activities related to strengthening, flexibility, endurance, ROM for improvements in LE, proximal hip, and core control with self care, mobility, lifting, ambulation.  [] (81475) Provided verbal/tactile cueing for activities related to improving balance, coordination, kinesthetic sense, posture, motor skill, proprioception  to assist with LE, proximal hip, and core control in self care, mobility, lifting, ambulation and eccentric single leg control.      NMR and Therapeutic Activities:    [] (61129 or 60195) Provided verbal/tactile cueing for activities related to improving balance, coordination, kinesthetic sense, posture, motor skill, proprioception and motor activation to allow for proper function of core, proximal hip and LE with self care and ADLs and functional mobility.   [] (73839) Gait Re-education- Provided training and instruction to the patient for proper LE, core and proximal hip recruitment and positioning and eccentric body weight control with ambulation re-education including up and down stairs     Home Exercise Program:    [x] (44615) Reviewed/Progressed HEP activities related to strengthening, flexibility, endurance, ROM of core, proximal hip and LE for functional self-care, mobility, lifting and ambulation/stair navigation   [] (35266)Reviewed/Progressed HEP activities related to improving balance, coordination, kinesthetic sense, posture, motor skill, proprioception of core, proximal hip and LE for self care, mobility, lifting, and ambulation/stair navigation      Manual Treatments:  PROM / STM / Oscillations-Mobs:  G-I, II, III, IV (PA's, Inf., Post.)  [] (51873) Provided manual therapy to mobilize LE, proximal hip and/or LS spine soft tissue/joints for the purpose of modulating pain, promoting relaxation,  increasing ROM, reducing/eliminating soft tissue swelling/inflammation/restriction, improving soft tissue extensibility and allowing for proper ROM for normal function with self care, mobility, lifting and ambulation. Charges:  Timed Code Treatment Minutes: 45   Total Treatment Minutes: 45      [] EVAL (LOW) 46504 (typically 20 minutes face-to-face)  [] EVAL (MOD) 87764 (typically 30 minutes face-to-face)  [] EVAL (HIGH) 81606 (typically 45 minutes face-to-face)  [] RE-EVAL     [x] HICKMAN(54153) x 2    [] Dry needle 1 or 2 Muscles (72690)  [] NMR (03020) x     [] Dry needle 3+ Muscles (90538)  [x] Manual (98450) x     [] Ultrasound (84562) x  [] TA (39498) x     [] Mech Traction (92792)  [] ES(attended) (93862)     [] ES (un) (80806):   [] Vasopump (05978) [] Ionto (30152)   [] Other:    GOALS:  Patient stated goal: \"To be able to function normally\"  []? Progressing: []? Met: []? Not Met: []? Adjusted     Therapist goals for Patient:   Short Term Goals: To be achieved in: 2 weeks  1. Independent in HEP and progression per patient tolerance, in order to prevent re-injury. []? Progressing: []? Met: []? Not Met: []? Adjusted  2. Patient will have a decrease in pain to facilitate improvement in movement, function, and ADLs as indicated by Functional Deficits. []? Progressing: []? Met: []? Not Met: []? Adjusted     Long Term Goals: To be achieved in: 6 weeks  1. Disability index score of 40% or less for the LEFS to assist with reaching prior level of function. []? Progressing: []? Met: []? Not Met: []? Adjusted  2.  Patient will demonstrate increased AROM knee flexion to 120 B knees to allow for proper joint

## 2021-01-22 ENCOUNTER — HOSPITAL ENCOUNTER (OUTPATIENT)
Dept: PHYSICAL THERAPY | Age: 56
Setting detail: THERAPIES SERIES
Discharge: HOME | End: 2021-01-22
Payer: MEDICARE

## 2021-01-22 NOTE — FLOWSHEET NOTE
[facility] [address]  [place] [address]  Phone: [phone]   Fax:     [phone]     Physical Therapy  Cancellation/No-show Note  Patient Name:  Kelli Oglesby  :  1965   Date:  2021  Cancelled visits to date: 1  No-shows to date: 0    Patient status for today's appointment patient:  [x]  Cancelled  []  Rescheduled appointment  []  No-show     Reason given by patient:  [x]  Patient ill - stomach issues  []  Conflicting appointment  []  No transportation    []  Conflict with work  []  No reason given  []  Other:     Comments:      Phone call information:   []  Phone call made today to patient at _ time at number provided:      []  Patient answered, conversation as follows:    []  Patient did not answer, message left as follows:  [x]  Phone call not made today    Electronically signed by:  Sandhya Neves, PTA

## 2021-01-27 ENCOUNTER — HOSPITAL ENCOUNTER (OUTPATIENT)
Dept: PHYSICAL THERAPY | Age: 56
Setting detail: THERAPIES SERIES
Discharge: HOME | End: 2021-01-27
Payer: MEDICARE

## 2021-01-27 PROCEDURE — 97110 THERAPEUTIC EXERCISES: CPT

## 2021-01-27 PROCEDURE — 97140 MANUAL THERAPY 1/> REGIONS: CPT

## 2021-01-27 NOTE — FLOWSHEET NOTE
[facility], [place] [address]  Phone: [phone]   Fax:     [phone]      Physical Therapy Treatment Note/ Progress Report:     Date:  2021    Patient Name:  Domenic Peguero    :  1965  MRN: [id number]    Pertinent Medical History:     Medical/Treatment Diagnosis Information:  · Diagnosis: Osteoarthris of B Knees M17.9  · Treatment Diagnosis: Decreased functional mobility    Insurance/Certification information:  PT Insurance Information: [place], allowed 30 PT/OT/ST visits per calendar year, per Sloane Mallory Ref #[id number]  Physician Information:  Referring Practitioner: Angela Davenport CNP  Plan of care signed (Y/N): faxed on 1/15/21    Date of Patient follow up with Physician:      Progress Report: []  Yes  [x]  No     Date Range for reporting period:  Beginnin/15/21  Ending:      Progress report due (10 Rx/or 30 days whichever is less):     Recertification due (POC duration/ or 90 days whichever is less):      Visit # POC/Insurance Allowable Auth Needed   2 12 []Yes   []No     Latex Allergy:  [x]NO      []YES  Preferred Language for Healthcare:   [x]English       []other:  Functional Scale: WOMAC = 70 raw    Date assessed: at eval    Pain level:  5-8/10 B knees     History of Injury:  Pt stated she was diagnosed with OA and her \"RA doctor did x-rays back in [facility]" and \"she said this is mild. \"  Pt cannot kneel, pt has pain with walking up steps, sitting too long, sometimes walking causes pain. Pt stated her knees \"feel like they'll lock up when I sit too long. \"  Pt had 2 PT evaluation schedule late  which pt cancelled related to pancreatitis. SUBJECTIVE:  See eval  21 pt has been doing HEP every other day. HEP \"feels good when you do them, stretching them out. \" Pt had soreness following initial PT eval.  Pt had a sleep study last night.  21 pt had stomach issues last week. Today stomach is okay, but \"today's a short of breath day. \"  Pt stated her knees are \"stiff\" today. OBJECTIVE:   Observation:    Test measurements:    Posture: flexed knees, R LE ER   Functional Mobility/Transfers: sit to stand with use of UE   Palpation: NT  Gait: (include devices/WB status) decreased L arm swing, Trendelenburg  Stair climbin rails: ascended with L LE first each step, descended with R LE first each step with body rotated toward the L     ROM LEFT 1/15/21 RIGHT  1/15/21   Knee ext 0 0   Knee Flex 110 122   Ankle PF WFL WFL   Ankle DF 5 5   Ankle In [facility] WFL   Ankle Ev [facility] WFL   Strength  LEFT RIGHT   HIP Flexors 4/5 4/5   HIP Abductors 4/5 4-/5   HIP ADD 4+/5 4/5   Hip ER 4/5 4/5   Knee EXT (quad) 4/5 4+/5   Knee Flex (HS) 5/5 5/5   Ankle DF 5/5 5/5   Ankle PF 5/5 5/5   Ankle Inv 5/5 5/5   Ankle EV 5/5 5/5         RESTRICTIONS/PRECAUTIONS: RA    Exercises/Interventions:     Therapeutic Ex (71647)   Min: Reps/Resistance Notes/CUES   Nu Step 5' Seat/arm 6   Hamstrings stretch  Gastroc incline stretch 2x30\"  2x30\"    JACOBY Knee extension  JACOBY Knee flexion Settings 8 and 9, 10x each    LAQ L/R  Hip flexion sitting 3#, 2x10  #3, 2x10    HS curl with t-band L/R [place] green, 2x10    Heel raises  Minisquat 10x2  10x2    Standing ABD L/R 2x10     Supine ABD AROM 10x    Fall outs 10x    SKTC 2x30\"         Manual Intervention ([id number])  Min:               Patella Mobs L/R Inf/med    STM peripatellar L/R Peripatellar              NMR re-education (35774)  Min:  CUES NEEDED             Therapeutic Activity (24822)  Min:               Modalities  Min:     IFC with      CP after exercises     MH after exercises            Other Therapeutic Activities: Pt was educated on PT POC, Diagnosis, Prognosis, pathomechanics as well as frequency and duration of scheduling future physical therapy appointments.  Time was also taken on this day to answer all patient questions and participation in PT. Reviewed appointment policy in detail with patient and patient verbalized understanding. 1/20/21 PT educated pt that if she feels fatigued (in particular from healing following bout of pancreatitis) to inform her treating therapist and PT could be modified. Pt did not require surgery for pancreatitis. Home Exercise Program: Patient was instructed in the following for HEP:  seated HS stretch, gastroc towel stretch, quad set, add set, SLR, access code [email or web address]. Patient verbalized/demonstrated understanding and was issued written handout. 1/27/21 SKTC, supine ABD, B fall outs, access code [email or web address]      Therapeutic Exercise and NMR EXR  [x] (31734) Provided verbal/tactile cueing for activities related to strengthening, flexibility, endurance, ROM for improvements in LE, proximal hip, and core control with self care, mobility, lifting, ambulation.  [] (96005) Provided verbal/tactile cueing for activities related to improving balance, coordination, kinesthetic sense, posture, motor skill, proprioception  to assist with LE, proximal hip, and core control in self care, mobility, lifting, ambulation and eccentric single leg control.      NMR and Therapeutic Activities:    [] (78137 or 92230) Provided verbal/tactile cueing for activities related to improving balance, coordination, kinesthetic sense, posture, motor skill, proprioception and motor activation to allow for proper function of core, proximal hip and LE with self care and ADLs and functional mobility.   [] (57505) Gait Re-education- Provided training and instruction to the patient for proper LE, core and proximal hip recruitment and positioning and eccentric body weight control with ambulation re-education including up and down stairs     Home Exercise Program:    [x] (36817) Reviewed/Progressed HEP activities related to strengthening, flexibility, endurance, ROM of core, proximal hip and LE for functional self-care, mobility, lifting and ambulation/stair navigation   [] (83924)Reviewed/Progressed HEP activities related to improving balance, coordination, kinesthetic sense, posture, motor skill, proprioception of core, proximal hip and LE for self care, mobility, lifting, and ambulation/stair navigation      Manual Treatments:  PROM / STM / Oscillations-Mobs:  G-I, II, III, IV (PA's, Inf., Post.)  [] (23301) Provided manual therapy to mobilize LE, proximal hip and/or LS spine soft tissue/joints for the purpose of modulating pain, promoting relaxation,  increasing ROM, reducing/eliminating soft tissue swelling/inflammation/restriction, improving soft tissue extensibility and allowing for proper ROM for normal function with self care, mobility, lifting and ambulation. Charges:  Timed Code Treatment Minutes: 45   Total Treatment Minutes: 45      [] EVAL (LOW) 67571 (typically 20 minutes face-to-face)  [] EVAL (MOD) 44066 (typically 30 minutes face-to-face)  [] EVAL (HIGH) 30762 (typically 45 minutes face-to-face)  [] RE-EVAL     [x] MW(22250) x 2    [] Dry needle 1 or 2 Muscles (28559)  [] NMR (81763) x     [] Dry needle 3+ Muscles (02444)  [x] Manual (52230) x     [] Ultrasound (57879) x  [] TA (47038) x     [] Mech Traction (73831)  [] ES(attended) (37716)     [] ES (un) (85392):   [] Vasopump (84216) [] Ionto (46174)   [] Other:    GOALS:  Patient stated goal: \"To be able to function normally\"  []? Progressing: []? Met: []? Not Met: []? Adjusted     Therapist goals for Patient:   Short Term Goals: To be achieved in: 2 weeks  1. Independent in HEP and progression per patient tolerance, in order to prevent re-injury. []? Progressing: []? Met: []? Not Met: []? Adjusted  2. Patient will have a decrease in pain to facilitate improvement in movement, function, and ADLs as indicated by Functional Deficits. []? Progressing: []? Met: []? Not Met: []? Adjusted     Long Term Goals: To be achieved in: 6 weeks  1.  Disability index score of 40% or less for the LEFS to assist with reaching prior level of function. []? Progressing: []? Met: []? Not Met: []? Adjusted  2. Patient will demonstrate increased AROM knee flexion to 120 B knees to allow for proper joint functioning as indicated by patients Functional Deficits. []? Progressing: []? Met: []? Not Met: []? Adjusted  3. Patient will demonstrate an increase in Strength to good proximal hip strength and control allow for proper functional mobility as indicated by patients Functional Deficits. []? Progressing: []? Met: []? Not Met: []? Adjusted  4. Patient will return to walk up and down steps without increased symptoms or restriction. []? Progressing: []? Met: []? Not Met: []? Adjusted  5. Patient will be able to play with her grand children. []? Progressing: []? Met: []? Not Met: []? Adjusted    ASSESSMENT:  See eval    Treatment/Activity Tolerance:  [x] Patient tolerated treatment well [] Patient limited by fatique  [] Patient limited by pain  [] Patient limited by other medical complications  [] Other:     Overall Progression Towards Functional goals/ Treatment Progress Update:  [] Patient is progressing as expected towards functional goals listed. [] Progression is slowed due to complexities/Impairments listed. [] Progression has been slowed due to co-morbidities. [x] Plan just implemented, too soon to assess goals progression <30days   [] Goals require adjustment due to lack of progress  [] Patient is not progressing as expected and requires additional follow up with physician  [] Other    Prognosis for POC: [x] Good [] Fair  [] Poor    Patient requires continued skilled intervention: [x] Yes  [] No        PLAN: PT faxed POC, check response.   [x] Continue per plan of care [] Alter current plan (see comments)  [] Plan of care initiated [] Hold pending MD visit [] Discharge    Electronically signed by: Keon Shelton, PT , OMT-C, [id number]      Note: If patient does not return for scheduled/recommended follow up visits, this note will serve as a discharge from care along with the most recent update on progress.

## 2021-01-27 NOTE — PROGRESS NOTES
[facility], [facility] [address] [place] [address]  Phone: [phone]   Fax:     [phone]                                                       Physical Therapy Certification    Dear Lg Deshpande CNP,    We had the pleasure of evaluating the following patient for physical therapy services at [facility]. A summary of our findings can be found in the initial assessment below. This includes our plan of care. If you have any questions or concerns regarding these findings, please do not hesitate to contact me at the office phone number checked above. Thank you for the referral.       Physician Signature:_______________________________Date:__________________  By signing above (or electronic signature), therapists plan is approved by physician        Patient: Anirudh Espinoza   : 1965   MRN: [id number]  Referring Physician:        Evaluation Date: 2021      Medical Diagnosis Information:     Referring Practitioner: Lg Deshpande CNP,                                             Evaluation Date: 1/15/2021                                         Medical Diagnosis Information:  Diagnosis: Osteoarthris of B Knees M17.9      Treatment Diagnosis: Decreased functional mobility                                         Insurance information: PT Insurance Information: [place], allowed 30 PT/OT/ST visits per calendar year, per Jaú Ref #20      Precautions/ Contra-indications:   Latex Allergy:  [x]NO      []YES  Preferred Language for Healthcare:   [x]English       []other:    C-SSRS Triggered by Intake questionnaire (Past 2 wk assessment ):   [] No, Questionnaire did not trigger screening.    [x] Yes, Patient intake triggered C-SSRS Screening      [x] C-SSRS Screening completed  [] PCP notified via Epic     SUBJECTIVE: Pt stated she was diagnosed with OA and her \"RA doctor did x-rays back in [facility]" and \"she said this is mild. \"  Pt cannot kneel, pt has pain with walking up steps, sitting too long, sometimes walking causes pain. Pt stated her knees \"feel like they'll lock up when I sit too long. \"  Pt had 2 PT evaluation schedule late  which pt cancelled related to pancreatitis. Relevant Medical History:RA  Functional Scale/Score: WOMAC = 70 raw    Pain Scale: 5-8/10 anterior B knees  Easing factors: lying flat supine  Provocative factors: walking too long, sitting too long, stairs     Type: [x]Constant   []Intermittent  []Radiating []Localized []other:     Numbness/Tingling: tingling anterior shin, L > R    Occupation/School: disability- \"for all my illnesses\"    Living Status/Prior Level of Function: Independent with ADLs and IADLs    OBJECTIVE:   Posture: flexed knees, R LE ER    Functional Mobility/Transfers: sit to stand with use of UE    Palpation: NT    Bandages/Dressings/Incisions: NA    Gait: (include devices/WB status) decreased L arm swing, Trendelenburg  Stair climbin rails: ascended with L LE first each step, descended with R LE first each step with body rotated toward the L    ROM LEFT RIGHT   HIP Flex     HIP Abd     HIP Ext     HIP IR     HIP ER     Knee ext 0 0   Knee Flex 110 122   Ankle PF WFL WFL   Ankle DF 5 5   Ankle In [facility] WFL   Ankle Ev [facility] WFL   Strength  LEFT RIGHT   HIP Flexors 4/5 4/5   HIP Abductors 4/5 4-/5   HIP ADD 4+/5 4/5   Hip ER 4/5 4/5   Knee EXT (quad) 4/5 4+/5   Knee Flex (HS) 5/5 5/5   Ankle DF 5/5 5/5   Ankle PF 5/5 5/5   Ankle Inv 5/5 5/5   Ankle EV 5/5 5/5          Reflexes/Sensation:    [x]Dermatomes/Myotomes intact    [x]Reflexes equal and normal bilaterally   []Other:    Joint mobility:    []Normal    []Hypo   []Hyper    Orthopedic Special Tests:                        [x] Patient history, allergies, meds reviewed. Medical chart reviewed. See intake form.      Review Of Systems (ROS):  [x]Performed Review of systems (Integumentary, CardioPulmonary, Neurological) by intake and observation. Intake form has been scanned into medical record. Patient has been instructed to contact their primary care physician regarding ROS issues if not already being addressed at this time. Co-morbidities/Complexities (which will affect course of rehabilitation):   []None           Arthritic conditions   [x]Rheumatoid arthritis (M05.9)  []Osteoarthritis (M19.91)   Cardiovascular conditions   [x]Hypertension (I10)  []Hyperlipidemia (E78.5)  []Angina pectoris (I20)  []Atherosclerosis (I70)  []CVA Musculoskeletal conditions   []Disc pathology   []Congenital spine pathologies   []Prior surgical intervention  []Osteoporosis (M81.8)  [x]Osteopenia (M85.8)   Endocrine conditions   []Hypothyroid (E03.9)  []Hyperthyroid Gastrointestinal conditions   []Constipation (F88.13)   Metabolic conditions   []Morbid obesity (E66.01)  [x]Diabetes type 1(E10.65) or 2 (E11.65)   [x]Neuropathy (G60.9)- feet     Pulmonary conditions   []Asthma (J45)  []Coughing   [x]COPD (J44.9)   Psychological Disorders  [x]Anxiety (F41.9)  []Depression (F32.9)   []Other:   [x]Other:   [address]  Former smoker x 2 years           Barriers to/and or personal factors that will affect rehab potential:              []Age  []Sex    []Smoker              []Motivation/Lack of Motivation                        [x]Co-Morbidities              []Cognitive Function, education/learning barriers              []Environmental, home barriers              []profession/work barriers  []past PT/medical experience  []other:  Justification:     Falls Risk Assessment (30 days):   [x] Falls Risk assessed and no intervention required.   [] Falls Risk assessed and Patient requires intervention due to being higher risk   TUG score (>12s at risk):     [] Falls education provided, including         ASSESSMENT:   Functional Impairments:     [x]Noted lumbar/proximal hip/LE hypomobility   [x]Decreased LE functional ROM   [x]Decreased core/proximal hip strength and neuromuscular control   [x]Decreased LE functional strength   [x]Reduced balance/proprioceptive control   []other:      Functional Activity Limitations (from functional questionnaire and intake)   []Reduced ability to tolerate prolonged functional positions   [x]Reduced ability or difficulty with changes of positions or transfers between positions   [x]Reduced ability to maintain good posture and demonstrate good body mechanics with sitting, bending, and lifting   [x]Reduced ability to sleep- pain with turning in bed   [] Reduced ability or tolerance with driving and/or computer work   [x]Reduced ability to perform lifting, carrying tasks   [x]Reduced ability to squat   []Reduced ability to forward bend   [x]Reduced ability to ambulate prolonged functional periods/distances/surfaces   [x]Reduced ability to ascend/descend stairs   [x]Reduced ability to run, hop or jump   [x]other: getting out of car     Participation Restrictions   [x]Reduced participation in self care activities   [x]Reduced participation in home management activities   []Reduced participation in work activities   [x]Reduced participation in social activities. []Reduced participation in sport activities. Classification :    []Signs/symptoms consistent with post-surgical status including decreased ROM, strength and function.    []Signs/symptoms consistent with joint sprain/strain   []Signs/symptoms consistent with patella-femoral syndrome   [x]Signs/symptoms consistent with knee OA/hip OA/RA   []Signs/symptoms consistent with internal derangement of knee/Hip   []Signs/symptoms consistent with functional hip weakness/NMR control      [x]Signs/symptoms consistent with tendinitis/tendinosis    []signs/symptoms consistent with pathology which may benefit from Dry needling      []other:      Prognosis/Rehab Potential:      []Excellent   [x]Good    []Fair   []Poor    Tolerance of evaluation/treatment: []Excellent   [x]Good    []Fair   []Poor    Physical Therapy Evaluation Complexity Justification  [x] A history of present problem with:  [] no personal factors and/or comorbidities that impact the plan of care;  []1-2 personal factors and/or comorbidities that impact the plan of care  [x]3 personal factors and/or comorbidities that impact the plan of care  [x] An examination of body systems using standardized tests and measures addressing any of the following: body structures and functions (impairments), activity limitations, and/or participation restrictions;:  [] a total of 1-2 or more elements   [] a total of 3 or more elements   [x] a total of 4 or more elements   [x] A clinical presentation with:  [x] stable and/or uncomplicated characteristics   [] evolving clinical presentation with changing characteristics  [] unstable and unpredictable characteristics;   [x] Clinical decision making of [] low, [] moderate, [] high complexity using standardized patient assessment instrument and/or measurable assessment of functional outcome. [] EVAL (LOW) 34624 (typically 20 minutes face-to-face)  [] EVAL (MOD) 88652 (typically 30 minutes face-to-face)  [] EVAL (HIGH) 60728 (typically 45 minutes face-to-face)  [] RE-EVAL     PLAN:  Frequency/Duration:  2 days per week for 4-6 Weeks:  Interventions:  [x]  Therapeutic exercise including: strength training, ROM, for Lower extremity and core   [x]  NMR activation and proprioception for LE, Glutes and Core   [x]  Manual therapy as indicated for LE, Hip and spine to include: Dry Needling/IASTM, STM, PROM, Gr I-IV mobilizations, manipulation. [x] Modalities as needed that may include: thermal agents, E-stim, Biofeedback, US, iontophoresis as indicated  [x] Patient education on joint protection, postural re-education, activity modification, progression of HEP.     HEP instruction: seated HS stretch, gastroc towel stretch, quad set, add set, SLR, access code [email or web address]    GOALS:  Patient stated goal: \"To be able to function normally\"  [] Progressing: [] Met: [] Not Met: [] Adjusted    Therapist goals for Patient:   Short Term Goals: To be achieved in: 2 weeks  1. Independent in HEP and progression per patient tolerance, in order to prevent re-injury. [] Progressing: [] Met: [] Not Met: [] Adjusted  2. Patient will have a decrease in pain to facilitate improvement in movement, function, and ADLs as indicated by Functional Deficits. [] Progressing: [] Met: [] Not Met: [] Adjusted    Long Term Goals: To be achieved in: 6 weeks  1. Disability index score of 40% or less for the LEFS to assist with reaching prior level of function. [] Progressing: [] Met: [] Not Met: [] Adjusted  2. Patient will demonstrate increased AROM knee flexion to 120 B knees to allow for proper joint functioning as indicated by patients Functional Deficits. [] Progressing: [] Met: [] Not Met: [] Adjusted  3. Patient will demonstrate an increase in Strength to good proximal hip strength and control allow for proper functional mobility as indicated by patients Functional Deficits. [] Progressing: [] Met: [] Not Met: [] Adjusted  4. Patient will return to walk up and down steps without increased symptoms or restriction. [] Progressing: [] Met: [] Not Met: [] Adjusted  5. Patient will be able to play with her grand children. [] Progressing: [] Met: [] Not Met: [] Adjusted     Electronically signed by:  Ashlie Palma PT , AIXA, [id number]        Note: If patient does not return for scheduled/recommended follow up visits, this note will serve as a discharge from care along with the most recent update on progress.

## 2021-01-29 ENCOUNTER — HOSPITAL ENCOUNTER (OUTPATIENT)
Dept: PHYSICAL THERAPY | Age: 56
Setting detail: THERAPIES SERIES
Discharge: HOME | End: 2021-01-29
Payer: MEDICARE

## 2021-01-29 NOTE — FLOWSHEET NOTE
[facility] [address]  [place] [address]  Phone: [phone]   Fax:     [phone]     Physical Therapy  Cancellation/No-show Note  Patient Name:  Francisco Javier Garza  :  1965   Date:  2021  Cancelled visits to date: 2  No-shows to date: 0    Patient status for today's appointment patient:  [x]  Cancelled  []  Rescheduled appointment  []  No-show     Reason given by patient:  [x]  Patient ill - Pancreatitis flare up  []  Conflicting appointment  []  No transportation    []  Conflict with work  []  No reason given  []  Other:     Comments:      Phone call information:   []  Phone call made today to patient at _ time at number provided:      []  Patient answered, conversation as follows:    []  Patient did not answer, message left as follows:  [x]  Phone call not made today    Electronically signed by:  Reddy Plaza, PTA

## 2021-02-02 ENCOUNTER — HOSPITAL ENCOUNTER (OUTPATIENT)
Age: 56
Discharge: HOME | End: 2021-02-02
Payer: MEDICARE

## 2021-02-02 ENCOUNTER — HOSPITAL ENCOUNTER (OUTPATIENT)
Dept: GENERAL RADIOLOGY | Age: 56
Discharge: HOME | End: 2021-02-02
Payer: MEDICARE

## 2021-02-02 DIAGNOSIS — J18.9 COMMUNITY ACQUIRED PNEUMONIA, UNSPECIFIED LATERALITY: ICD-10-CM

## 2021-02-02 PROCEDURE — 71046 X-RAY EXAM CHEST 2 VIEWS: CPT

## 2021-02-03 ENCOUNTER — HOSPITAL ENCOUNTER (OUTPATIENT)
Dept: PHYSICAL THERAPY | Age: 56
Setting detail: THERAPIES SERIES
Discharge: HOME | End: 2021-02-03
Payer: MEDICARE

## 2021-02-03 NOTE — FLOWSHEET NOTE
[facility] [address] [place] [address]  Phone: [phone]   Fax:     [phone]     Physical Therapy  Cancellation/No-show Note  Patient Name:  Harry Asencio  :  1965   Date:  2/3/2021  Cancelled visits to date: 3  No-shows to date: 0    Patient status for today's appointment patient:  [x]  Cancelled  []  Rescheduled appointment  []  No-show     Reason given by patient:  [x]  Patient ill - Pneumonia  []  Conflicting appointment  []  No transportation    []  Conflict with work  []  No reason given  []  Other:     Comments:      Phone call information:   []  Phone call made today to patient at _ time at number provided:      []  Patient answered, conversation as follows: PT left message that PT cancelled pt's session on Friday, 21 as well.    []  Patient did not answer, message left as follows:  [x]  Phone call not made today    Electronically signed by:  Anita Minaya PT

## 2021-02-10 ENCOUNTER — HOSPITAL ENCOUNTER (OUTPATIENT)
Dept: PHYSICAL THERAPY | Age: 56
Setting detail: THERAPIES SERIES
Discharge: HOME | End: 2021-02-10
Payer: MEDICARE

## 2021-02-10 NOTE — PROGRESS NOTES
[facility], [facility] [address] [address]  Phone: [phone]   Fax:     [phone]    Physical Therapy Discharge Summary    Dear Edrick Apgar, CNP,    We had the pleasure of treating the following patient for physical therapy services at [address] [place] [address]. A summary of our findings can be found in the discharge summary below. This includes our final assessment. If you have any questions or concerns regarding these findings, please do not hesitate to contact me at the office phone number checked above. Thank you for the referral.       Physician Signature:________________________________Date:__________________  By signing above, therapists plan is approved by physician          Patient:Patient: Tanja Paris                                    : 1965                                    MRN: [id number]  Referring Physician:                                                   Evaluation Date: 2021                                         Medical Diagnosis Information:     Referring Practitioner: Edrick Apgar, CNP,                                             Evaluation Date: 1/15/2021                                         Medical Diagnosis Information:  Diagnosis: Osteoarthris of B Knees M17.9      Treatment Diagnosis: Decreased functional mobility                                         Insurance information: PT Insurance Information: [place], allowed 30 PT/OT/ST visits per calendar year, per Kathy Duffy Ref #[id number]     Functional Outcomes Measure: at discharge  Test: [facility]   Score:1/15/21 WOMAC = 70 raw= 72.95 dysfunction    SUBJECTIVE:   21 pt has been doing HEP every other day. HEP \"feels good when you do them, stretching them out. \" Pt had soreness following initial PT eval.  Pt had a sleep study last night.  21 pt had stomach issues last week.  Today stomach is okay, but \"today's a short of breath day. \"  Pt stated her knees are \"stiff\" today. Pain Scale: 5-8/10 B knees        Type: [x]Constant   []Intermitment  []Radiating []Localized  []other:     Functional Limitations: []Sitting []Standing []Walking    []Squatting []Stairs            []ADL's  []Driving []Sports/Recreation  []Other:      OBJECTIVE:  ROM LEFT 1/15/21 RIGHT  1/15/21   Knee ext 0 0   Knee Flex 110 122   Ankle PF WFL WFL   Ankle DF 5 5   Ankle In [facility] WFL   Ankle Ev [facility] WFL   Strength  LEFT RIGHT   HIP Flexors 4/5 4/5   HIP Abductors 4/5 4-/5   HIP ADD 4+/5 4/5   Hip ER 4/5 4/5   Knee EXT (quad) 4/5 4+/5   Knee Flex (HS) 5/5 5/5   Ankle DF 5/5 5/5   Ankle PF 5/5 5/5   Ankle Inv 5/5 5/5   Ankle EV 5/5 5/5         ASSESSMENT: Pt attended 2 PT visits and cancelled 4 PT visits related to other health issues. Pt was issued a HEP. PT has been unable to reassess pt. Response to Treatment:   []Patient met all goals and has responded well to treatment and will continue HEP   []Patient should continue to improve in reasonable time if they continue HEP   []Patient has plateaued and is no longer responding to skilled PT intervention    []Patient is getting worse and would benefit from return to referring MD   [x]Unable to reassess      GOALS:   Short Term Goals: To be achieved in: 2 weeks  1. Independent in HEP and progression per patient tolerance, in order to prevent re-injury. []?? Progressing: []?? Met: []?? Not Met: [x]? ? NT  2. Patient will have a decrease in pain to facilitate improvement in movement, function, and ADLs as indicated by Functional Deficits. []?? Progressing: []?? Met: []?? Not Met: [x]? ? NT     Long Term Goals: To be achieved in: 6 weeks  1. Disability index score of 40% or less for the LEFS to assist with reaching prior level of function.   []?? Progressing: []?? Met: []?? Not Met: [x]? ? NT  2.  Patient will demonstrate increased AROM knee flexion to 120 B knees to allow for proper joint functioning as indicated by patients Functional Deficits. []?? Progressing: []?? Met: []?? Not Met: [x]? ? NT  3. Patient will demonstrate an increase in Strength to good proximal hip strength and control allow for proper functional mobility as indicated by patients Functional Deficits. []?? Progressing: []?? Met: []?? Not Met: [x]? NT  4. Patient will return to walk up and down steps without increased symptoms or restriction.   []?? Progressing: []?? Met: []?? Not Met: [x]? ? NT  5. Patient will be able to play with her grand children.   []?? Progressing: []?? Met: []?? Not Met: [x]? ? NT     PLAN: DC PT per attendance policy.       Reason for Discharge:  [] Goals Met   [] Patient did not return after initial evaluation   [] Progress Plateaued  [x] Missed ___4__ scheduled appointments   [] No insurance coverage [] Patient terminated therapy   [] MD discharged from PT [] Financial Limitations  [] Other:      Electronically signed by:  Sary Frederick PT

## 2021-02-10 NOTE — FLOWSHEET NOTE
[facility] [place] [address] [place] [address]  Phone: [phone]   Fax:     [phone]     Physical Therapy  Cancellation/No-show Note  Patient Name:  Jacquelyn Cat  :  1965   Date:  2/10/2021  Cancelled visits to date: 4  No-shows to date: 0    Patient status for today's appointment patient:  [x]  Cancelled  []  Rescheduled appointment  []  No-show     Reason given by patient:  []  Patient ill  []  Conflicting appointment  []  No transportation    []  Conflict with work  []  No reason given  []  Other:     Comments:   Pt called yesterday to cancel for today, no known reason. Plan: DC PT per attendance policy. Phone call information:   []  Phone call made today to patient at _ time at number provided:      []  Patient answered, conversation as follows: PT left message that PT cancelled pt's session on Friday, 21 as well.    []  Patient did not answer, message left as follows:  [x]  Phone call not made today    Electronically signed by:  Enrique Pacheco PT

## 2021-02-27 ENCOUNTER — HOSPITAL ENCOUNTER (EMERGENCY)
Age: 56
Discharge: HOME | End: 2021-02-27
Attending: EMERGENCY MEDICINE
Payer: MEDICARE

## 2021-02-27 ENCOUNTER — APPOINTMENT (OUTPATIENT)
Dept: CT IMAGING | Age: 56
End: 2021-02-27
Payer: MEDICARE

## 2021-02-27 VITALS
DIASTOLIC BLOOD PRESSURE: 70 MMHG | WEIGHT: 221.12 LBS | SYSTOLIC BLOOD PRESSURE: 142 MMHG | OXYGEN SATURATION: 96 % | RESPIRATION RATE: 15 BRPM | HEART RATE: 77 BPM | TEMPERATURE: 98.7 F | BODY MASS INDEX: 43.18 KG/M2

## 2021-02-27 DIAGNOSIS — R10.9 RIGHT FLANK PAIN: ICD-10-CM

## 2021-02-27 DIAGNOSIS — K85.00 IDIOPATHIC ACUTE PANCREATITIS WITHOUT INFECTION OR NECROSIS: Primary | ICD-10-CM

## 2021-02-27 LAB
A/G RATIO: 1.1 (ref 1.1–2.2)
ALBUMIN SERPL-MCNC: 3.9 G/DL (ref 3.4–5)
ALP BLD-CCNC: 149 U/L (ref 40–129)
ALT SERPL-CCNC: 67 U/L (ref 10–40)
ANION GAP SERPL CALCULATED.3IONS-SCNC: 11 MMOL/L (ref 3–16)
AST SERPL-CCNC: 90 U/L (ref 15–37)
BACTERIA: ABNORMAL /HPF
BASOPHILS ABSOLUTE: 0.1 K/UL (ref 0–0.2)
BASOPHILS RELATIVE PERCENT: 1.8 %
BILIRUB SERPL-MCNC: 0.5 MG/DL (ref 0–1)
BILIRUBIN URINE: NEGATIVE
BLOOD, URINE: ABNORMAL
BUN BLDV-MCNC: 11 MG/DL (ref 7–20)
CALCIUM SERPL-MCNC: 9.2 MG/DL (ref 8.3–10.6)
CHLORIDE BLD-SCNC: 106 MMOL/L (ref 99–110)
CLARITY: CLEAR
CO2: 23 MMOL/L (ref 21–32)
COLOR: YELLOW
CREAT SERPL-MCNC: 0.7 MG/DL (ref 0.6–1.1)
EOSINOPHILS ABSOLUTE: 0.4 K/UL (ref 0–0.6)
EOSINOPHILS RELATIVE PERCENT: 5.4 %
EPITHELIAL CELLS, UA: ABNORMAL /HPF (ref 0–5)
GFR AFRICAN AMERICAN: >60
GFR NON-AFRICAN AMERICAN: >60
GLOBULIN: 3.4 G/DL
GLUCOSE BLD-MCNC: 117 MG/DL (ref 70–99)
GLUCOSE URINE: NEGATIVE MG/DL
HCT VFR BLD CALC: 41.8 % (ref 36–48)
HEMOGLOBIN: 13.6 G/DL (ref 12–16)
KETONES, URINE: NEGATIVE MG/DL
LEUKOCYTE ESTERASE, URINE: NEGATIVE
LIPASE: 188 U/L (ref 13–60)
LYMPHOCYTES ABSOLUTE: 2.1 K/UL (ref 1–5.1)
LYMPHOCYTES RELATIVE PERCENT: 29.7 %
MCH RBC QN AUTO: 29.9 PG (ref 26–34)
MCHC RBC AUTO-ENTMCNC: 32.5 G/DL (ref 31–36)
MCV RBC AUTO: 91.8 FL (ref 80–100)
MICROSCOPIC EXAMINATION: YES
MONOCYTES ABSOLUTE: 0.8 K/UL (ref 0–1.3)
MONOCYTES RELATIVE PERCENT: 10.7 %
NEUTROPHILS ABSOLUTE: 3.7 K/UL (ref 1.7–7.7)
NEUTROPHILS RELATIVE PERCENT: 52.4 %
NITRITE, URINE: NEGATIVE
PDW BLD-RTO: 12.9 % (ref 12.4–15.4)
PH UA: 5 (ref 5–8)
PLATELET # BLD: 202 K/UL (ref 135–450)
PMV BLD AUTO: 8.5 FL (ref 5–10.5)
POTASSIUM SERPL-SCNC: 4.3 MMOL/L (ref 3.5–5.1)
PROTEIN UA: NEGATIVE MG/DL
RBC # BLD: 4.55 M/UL (ref 4–5.2)
RBC UA: ABNORMAL /HPF (ref 0–4)
SODIUM BLD-SCNC: 140 MMOL/L (ref 136–145)
SPECIFIC GRAVITY UA: 1.02 (ref 1–1.03)
TOTAL PROTEIN: 7.3 G/DL (ref 6.4–8.2)
URINE REFLEX TO CULTURE: ABNORMAL
URINE TYPE: ABNORMAL
UROBILINOGEN, URINE: 0.2 E.U./DL
WBC # BLD: 7.1 K/UL (ref 4–11)
WBC UA: ABNORMAL /HPF (ref 0–5)

## 2021-02-27 PROCEDURE — 81001 URINALYSIS AUTO W/SCOPE: CPT

## 2021-02-27 PROCEDURE — 99284 EMERGENCY DEPT VISIT MOD MDM: CPT

## 2021-02-27 PROCEDURE — 83690 ASSAY OF LIPASE: CPT

## 2021-02-27 PROCEDURE — 74176 CT ABD & PELVIS W/O CONTRAST: CPT

## 2021-02-27 PROCEDURE — 36415 COLL VENOUS BLD VENIPUNCTURE: CPT

## 2021-02-27 PROCEDURE — 96374 THER/PROPH/DIAG INJ IV PUSH: CPT

## 2021-02-27 PROCEDURE — 96375 TX/PRO/DX INJ NEW DRUG ADDON: CPT

## 2021-02-27 PROCEDURE — 85025 COMPLETE CBC W/AUTO DIFF WBC: CPT

## 2021-02-27 PROCEDURE — 80053 COMPREHEN METABOLIC PANEL: CPT

## 2021-02-27 PROCEDURE — 6360000002 HC RX W HCPCS: Performed by: EMERGENCY MEDICINE

## 2021-02-27 RX ORDER — ONDANSETRON 4 MG/1
4 TABLET, ORALLY DISINTEGRATING ORAL EVERY 6 HOURS PRN
Qty: 12 TABLET | Refills: 0 | Status: SHIPPED | OUTPATIENT
Start: 2021-02-27 | End: 2022-04-11

## 2021-02-27 RX ORDER — ONDANSETRON 2 MG/ML
4 INJECTION INTRAMUSCULAR; INTRAVENOUS ONCE
Status: COMPLETED | OUTPATIENT
Start: 2021-02-27 | End: 2021-02-27

## 2021-02-27 RX ORDER — OXYCODONE HYDROCHLORIDE AND ACETAMINOPHEN 5; 325 MG/1; MG/1
1 TABLET ORAL EVERY 6 HOURS PRN
Qty: 12 TABLET | Refills: 0 | Status: SHIPPED | OUTPATIENT
Start: 2021-02-27 | End: 2021-03-02

## 2021-02-27 RX ORDER — KETOROLAC TROMETHAMINE 30 MG/ML
30 INJECTION, SOLUTION INTRAMUSCULAR; INTRAVENOUS ONCE
Status: COMPLETED | OUTPATIENT
Start: 2021-02-27 | End: 2021-02-27

## 2021-02-27 RX ADMIN — KETOROLAC TROMETHAMINE 30 MG: 30 INJECTION, SOLUTION INTRAMUSCULAR at 19:07

## 2021-02-27 RX ADMIN — ONDANSETRON 4 MG: 2 INJECTION INTRAMUSCULAR; INTRAVENOUS at 19:07

## 2021-02-27 ASSESSMENT — PAIN DESCRIPTION - ORIENTATION: ORIENTATION: RIGHT

## 2021-02-27 ASSESSMENT — PAIN - FUNCTIONAL ASSESSMENT: PAIN_FUNCTIONAL_ASSESSMENT: 0-10

## 2021-02-27 ASSESSMENT — PAIN DESCRIPTION - PROGRESSION: CLINICAL_PROGRESSION: GRADUALLY WORSENING

## 2021-02-27 ASSESSMENT — PAIN DESCRIPTION - PAIN TYPE: TYPE: ACUTE PAIN

## 2021-02-27 ASSESSMENT — PAIN DESCRIPTION - LOCATION: LOCATION: FLANK

## 2021-02-27 ASSESSMENT — PAIN DESCRIPTION - FREQUENCY: FREQUENCY: CONTINUOUS

## 2021-02-27 ASSESSMENT — PAIN SCALES - GENERAL
PAINLEVEL_OUTOF10: 5

## 2021-02-27 NOTE — ED PROVIDER NOTES
[facility]  eMERGENCY dEPARTMENT eNCOUnter      Pt Name: Sanjuana Patrick  MRN: [id number]  [place] 1965  Date of evaluation: 2/27/2021  Provider: Peri Reyes MD    [address] [place] [address]       Chief Complaint   Patient presents with    Flank Pain     right flank pain x 5 days, worse today wit radiation to right groin. Pain worse wiht movement         CRITICAL CARE TIME   Total Critical Care time was 0 minutes, excluding separately reportable procedures. There was a high probability of clinically significant/life threatening deterioration in the patient's condition which required my urgent intervention. HISTORY OF PRESENT ILLNESS  (Location/Symptom, Timing/Onset, Context/Setting, Quality, Duration, Modifying Factors, Severity.)   Sanjuana Patrick is a 55 y.o. female who presents to the emergency department complaining of pain in her right flank and abdomen. She noticed some pain in her right flank area about 5 days ago. It was gradual in onset getting worse. She noticed the pain radiating into her right mid abdomen today. She said some nausea but no vomiting. No injuries. No frequency urgency or dysuria. She has had previous kidney stones, the pain feels somewhat similar. No radiation to the leg. No chest pain or shortness of breath. She is status post cholecystectomy and hysterectomy. She is a history of pancreatitis in the past      Nursing Notes were reviewed and I agree. REVIEW OF SYSTEMS    (2-9 systems for level 4, 10 or more for level 5)     General: No fever or chills. ENT: No nasal congestion sore throat or earache. Cardiovascular: No chest pain. Pulmonary: No shortness of breath or cough. GI: Right flank pain radiating into the right abdomen. Nausea but no vomiting. No diarrhea constipation. : Status post hysterectomy. No frequency urgency or dysuria. No change in the color of her urine. Musculoskeletal: Right flank pain.   No upper or lower extremity pain or swelling. Neuro: No extremity weakness numbness or tingling. Except as noted above the remainder of the review of systems was reviewed and negative. PAST MEDICAL HISTORY     Past Medical History:   Diagnosis Date    COPD [facility]chronic obstructive pulmonary disease) [facility] [address] [facility]     COPD (chronic obstructive pulmonary disease) (HCC)     Diabetes mellitus (HCC)     Hypertension     GARCIA (nonalcoholic steatohepatitis)     Pancreatitis     Prediabetes     RA (rheumatoid arthritis) (HCC)     [facility]          SURGICAL HISTORY       Past Surgical History:   Procedure Laterality Date     SECTION      CHOLECYSTECTOMY      GALLBLADDER SURGERY      HYSTERECTOMY      TUBAL LIGATION           CURRENT MEDICATIONS       Previous Medications    ADALIMUMAB 40 MG/0.4ML PNKT    Inject 40 mg into the skin    ALBUTEROL SULFATE  (90 BASE) MCG/ACT INHALER        AMLODIPINE (NORVASC) 5 MG TABLET    Take 5 mg by mouth    FLUTICASONE (FLONASE) 50 MCG/ACT NASAL SPRAY    SPRAY 2 SPRAY(S) EVERY DAY BY INTRANASAL ROUTE AS NEEDED.     HYDROCHLOROTHIAZIDE (HYDRODIURIL) 25 MG TABLET    Take 25 mg by mouth    HYDROXYZINE (VISTARIL) 25 MG CAPSULE    Take 25 mg by mouth    IBUPROFEN (ADVIL;MOTRIN) 600 MG TABLET    Take 1 tablet by mouth every 6 hours as needed for Pain    OMEGA-3 FATTY ACIDS (FISH OIL PO)    Take by mouth    PREDNISONE (DELTASONE) 20 MG TABLET    Take 3 tablets today then 2 tablets daily for 6 more days    SPIRIVA RESPIMAT 2.5 MCG/ACT AERS INHALER    TAKE 2 PUFFS BY MOUTH EVERY DAY       ALLERGIES     Fluoxetine, Hydrocodone-acetaminophen, Penicillins, Serotonin reuptake inhibitors (ssris), Venlafaxine, Clindamycin/lincomycin, and Keflex [cephalexin]    FAMILY HISTORY       Family History   Problem Relation Age of Onset    High Blood Pressure Mother     Other Mother         thyroid    Heart Disease Mother           SOCIAL HISTORY       Social History     Socioeconomic History    Marital status:      Spouse name: None    Number of children: None    Years of education: None    Highest education level: None   Occupational History    None   Social Needs    Financial resource strain: None    Food insecurity     Worry: None     Inability: None    Transportation needs     Medical: None     Non-medical: None   Tobacco Use    Smoking status: Former Smoker     Packs/day: 0.50     Years: 30.00     Pack years: 15.00     Types: Cigarettes    Smokeless tobacco: Never Used   Substance and Sexual Activity    Alcohol use: No     Alcohol/week: 0.0 standard drinks    Drug use: No    Sexual activity: None   Lifestyle    Physical activity     Days per week: None     Minutes per session: None    Stress: None   Relationships    Social connections     Talks on phone: None     Gets together: None     Attends Holiness service: None     Active member of club or organization: None     Attends meetings of clubs or organizations: None     Relationship status: None    Intimate partner violence     Fear of current or ex partner: None     Emotionally abused: None     Physically abused: None     Forced sexual activity: None   Other Topics Concern    None   Social History Narrative    None         PHYSICAL EXAM    (up to 7 for level 4, 8 or more for level 5)     ED Triage Vitals [02/27/21 1841]   BP Temp Temp Source Pulse Resp SpO2 Height Weight   -- 98.7 °F (37.1 °C) Oral -- -- -- -- 221 lb 1.9 oz (100.3 kg)       General: Alert morbidly obese white female no acute distress. Head: Atraumatic and normocephalic. Eyes: No conjunctival injection. No pallor. Pupils equal round reactive. Extraocular movements are intact. ENT: Leanora Go is clear. Oropharynx moist without erythema. Neck: Supple without adenopathy, nontender. Heart: Regular rate and rhythm. No murmurs or gallops noted. Lungs: Breath sounds equal bilaterally and clear. Abdomen: Obese, soft, nontender.   No masses organomegaly. Bowel sounds are normal.  Mild right flank tenderness. Musculoskeletal: No lower extremity edema. No calf tenderness. Intact symmetrical distal pulses. Skin: Warm and dry, good turgor, no rash. Neuro: Awake, alert, oriented. No focal motor deficits. Normal gait. DIFFERENTIAL DIAGNOSIS   Differential includes but is not limited to lumbar strain, ureterolithiasis, pyelonephritis, pancreatitis      DIAGNOSTIC RESULTS     EKG: All EKG's are interpreted by Bella Almanzar MD in the absence of a cardiologist.      RADIOLOGY:   Non-plain film images such as CT, Ultrasound and MRI are read by the radiologist. Plain radiographic images are visualized and preliminarily interpreted Bella Almanzar MD with the below findings:      Interpretation per the Radiologist below, if available at the time of this note:    [address]   Final Result   1. No renal, ureteral, or bladder calculi. No hydronephrosis. 2. No appendicitis, diverticulitis, or small bowel obstruction. 3. Nodular contour of the liver suspicious for cirrhosis. Correlate with   history. MR would better evaluate if indicated. 4.  Other findings as described.                ED BEDSIDE ULTRASOUND:   Performed by ED Physician - none    LABS:  Labs Reviewed   COMPREHENSIVE METABOLIC PANEL - Abnormal; Notable for the following components:       Result Value    Glucose 117 (*)     Alkaline Phosphatase 149 (*)     ALT 67 (*)     AST 90 (*)     All other components within normal limits    Narrative:     Performed at:  [facility]) - [place] [address] [facility]   Phone [phone]   LIPASE - Abnormal; Notable for the following components:    Lipase 188.0 (*)     All other components within normal limits    Narrative:     Performed at:  [facility]) - [place] [address] [address] [facility]   Phone [phone] diverticulitis on CT scan. She has had a cholecystectomy. Her CT findings are consistent with her history of cirrhosis. There are no abnormalities of the pancreas noted. Her symptoms could be related to mild pancreatitis. She will be treated symptomatically with short-term pain control and antiemetics with close follow-up with her primary care physician this week. I do not think she needs to be admitted at this time. She has a nonsurgical abdomen. Test results, diagnosis, and treatment plan have been discussed with the patient. She understands her treatment plan and follow-up as discussed. CONSULTS:  None    PROCEDURES:  None    FINAL IMPRESSION      1. Idiopathic acute pancreatitis without infection or necrosis    2. Right flank pain          DISPOSITION/PLAN   DISPOSITION Decision To Discharge 02/27/2021 09:20:04 PM      PATIENT REFERRED TO:  [facility] [address] [id number]  [phone]    In 3 days        DISCHARGE MEDICATIONS:  New Prescriptions    ONDANSETRON (ZOFRAN ODT) 4 MG DISINTEGRATING TABLET    Take 1 tablet by mouth every 6 hours as needed for Nausea    OXYCODONE-ACETAMINOPHEN (PERCOCET) 5-325 MG PER TABLET    Take 1 tablet by mouth every 6 hours as needed for Pain for up to 3 days. Intended supply: 3 days.  Take lowest dose possible to manage pain       (Please note that portions of this note were completed with a voice recognition program.  Efforts were made to edit the dictations but occasionally words are mis-transcribed.)    Gina Lane MD  Attending Emergency Physician        Martin Escobedo MD  02/27/21 9334

## 2021-03-02 ENCOUNTER — HOSPITAL ENCOUNTER (OUTPATIENT)
Dept: GENERAL RADIOLOGY | Age: 56
Discharge: HOME | End: 2021-03-02
Payer: MEDICARE

## 2021-03-02 ENCOUNTER — HOSPITAL ENCOUNTER (OUTPATIENT)
Age: 56
Discharge: HOME | End: 2021-03-02
Payer: MEDICARE

## 2021-03-02 DIAGNOSIS — R07.89 OTHER CHEST PAIN: ICD-10-CM

## 2021-03-02 PROCEDURE — 71046 X-RAY EXAM CHEST 2 VIEWS: CPT

## 2021-03-07 ENCOUNTER — HOSPITAL ENCOUNTER (EMERGENCY)
Age: 56
Discharge: HOME | End: 2021-03-07
Attending: EMERGENCY MEDICINE
Payer: MEDICARE

## 2021-03-07 VITALS
SYSTOLIC BLOOD PRESSURE: 144 MMHG | BODY MASS INDEX: 43.91 KG/M2 | RESPIRATION RATE: 16 BRPM | TEMPERATURE: 98.4 F | HEART RATE: 91 BPM | DIASTOLIC BLOOD PRESSURE: 82 MMHG | HEIGHT: 60 IN | OXYGEN SATURATION: 96 %

## 2021-03-07 DIAGNOSIS — R10.13 ABDOMINAL PAIN, EPIGASTRIC: Primary | ICD-10-CM

## 2021-03-07 LAB
A/G RATIO: 1.2 (ref 1.1–2.2)
ALBUMIN SERPL-MCNC: 4.1 G/DL (ref 3.4–5)
ALP BLD-CCNC: 144 U/L (ref 40–129)
ALT SERPL-CCNC: 67 U/L (ref 10–40)
ANION GAP SERPL CALCULATED.3IONS-SCNC: 11 MMOL/L (ref 3–16)
AST SERPL-CCNC: 87 U/L (ref 15–37)
BASOPHILS ABSOLUTE: 0.1 K/UL (ref 0–0.2)
BASOPHILS RELATIVE PERCENT: 1.2 %
BILIRUB SERPL-MCNC: 0.5 MG/DL (ref 0–1)
BUN BLDV-MCNC: 12 MG/DL (ref 7–20)
CALCIUM SERPL-MCNC: 9.7 MG/DL (ref 8.3–10.6)
CHLORIDE BLD-SCNC: 104 MMOL/L (ref 99–110)
CO2: 25 MMOL/L (ref 21–32)
CREAT SERPL-MCNC: 0.6 MG/DL (ref 0.6–1.1)
EOSINOPHILS ABSOLUTE: 0.2 K/UL (ref 0–0.6)
EOSINOPHILS RELATIVE PERCENT: 3.4 %
GFR AFRICAN AMERICAN: >60
GFR NON-AFRICAN AMERICAN: >60
GLOBULIN: 3.5 G/DL
GLUCOSE BLD-MCNC: 152 MG/DL (ref 70–99)
HCT VFR BLD CALC: 44.4 % (ref 36–48)
HEMOGLOBIN: 14.2 G/DL (ref 12–16)
LIPASE: 50 U/L (ref 13–60)
LYMPHOCYTES ABSOLUTE: 1.4 K/UL (ref 1–5.1)
LYMPHOCYTES RELATIVE PERCENT: 23.6 %
MCH RBC QN AUTO: 29.7 PG (ref 26–34)
MCHC RBC AUTO-ENTMCNC: 32.1 G/DL (ref 31–36)
MCV RBC AUTO: 92.6 FL (ref 80–100)
MONOCYTES ABSOLUTE: 0.5 K/UL (ref 0–1.3)
MONOCYTES RELATIVE PERCENT: 8.1 %
NEUTROPHILS ABSOLUTE: 3.7 K/UL (ref 1.7–7.7)
NEUTROPHILS RELATIVE PERCENT: 63.7 %
PDW BLD-RTO: 13.2 % (ref 12.4–15.4)
PLATELET # BLD: 218 K/UL (ref 135–450)
PMV BLD AUTO: 8.2 FL (ref 5–10.5)
POTASSIUM REFLEX MAGNESIUM: 3.8 MMOL/L (ref 3.5–5.1)
RBC # BLD: 4.79 M/UL (ref 4–5.2)
SODIUM BLD-SCNC: 140 MMOL/L (ref 136–145)
TOTAL PROTEIN: 7.6 G/DL (ref 6.4–8.2)
WBC # BLD: 5.9 K/UL (ref 4–11)

## 2021-03-07 PROCEDURE — 99284 EMERGENCY DEPT VISIT MOD MDM: CPT

## 2021-03-07 PROCEDURE — 6360000002 HC RX W HCPCS: Performed by: EMERGENCY MEDICINE

## 2021-03-07 PROCEDURE — 85025 COMPLETE CBC W/AUTO DIFF WBC: CPT

## 2021-03-07 PROCEDURE — 83690 ASSAY OF LIPASE: CPT

## 2021-03-07 PROCEDURE — 80053 COMPREHEN METABOLIC PANEL: CPT

## 2021-03-07 PROCEDURE — 36415 COLL VENOUS BLD VENIPUNCTURE: CPT

## 2021-03-07 PROCEDURE — 96374 THER/PROPH/DIAG INJ IV PUSH: CPT

## 2021-03-07 RX ORDER — GABAPENTIN 100 MG/1
100 CAPSULE ORAL NIGHTLY
COMMUNITY
Start: 2021-02-28

## 2021-03-07 RX ORDER — DIAZEPAM 5 MG/1
2.5 TABLET ORAL DAILY PRN
COMMUNITY
Start: 2020-12-02

## 2021-03-07 RX ORDER — LOSARTAN POTASSIUM 50 MG/1
50 TABLET ORAL DAILY
COMMUNITY
Start: 2021-02-11

## 2021-03-07 RX ORDER — HYDROCORTISONE 25 MG/G
CREAM TOPICAL
COMMUNITY
Start: 2020-12-10 | End: 2022-04-11

## 2021-03-07 RX ORDER — AMITRIPTYLINE HYDROCHLORIDE 25 MG/1
TABLET, FILM COATED ORAL
COMMUNITY
Start: 2021-03-02 | End: 2022-09-10 | Stop reason: ALTCHOICE

## 2021-03-07 RX ORDER — ONDANSETRON 2 MG/ML
4 INJECTION INTRAMUSCULAR; INTRAVENOUS ONCE
Status: COMPLETED | OUTPATIENT
Start: 2021-03-07 | End: 2021-03-07

## 2021-03-07 RX ADMIN — ONDANSETRON 4 MG: 2 INJECTION INTRAMUSCULAR; INTRAVENOUS at 19:50

## 2021-03-07 ASSESSMENT — ENCOUNTER SYMPTOMS
VOMITING: 1
ABDOMINAL PAIN: 1
NAUSEA: 1
RESPIRATORY NEGATIVE: 1
EYES NEGATIVE: 1

## 2021-03-07 ASSESSMENT — PAIN SCALES - GENERAL: PAINLEVEL_OUTOF10: 0

## 2021-03-08 NOTE — ED PROVIDER NOTES
[address] ENCOUNTER        Pt Name: Rinku Manriquez  MRN: [id number]  [place] 1965  Date of evaluation: 3/7/2021  Provider: Levy Clark MD  PCP: Adonis Obando, APRN - [address]       Chief Complaint   Patient presents with    Abdominal Pain     all day vomit x 1       HISTORY OF PRESENT ILLNESS   (Location/Symptom, Timing/Onset, Context/Setting, Quality, Duration, Modifying Factors, Severity)  Note limiting factors. Rinku Manriquez is a 55 y.o. female who comes in with complaint of epigastric abdominal pain and vomiting throughout the day today. There is no diarrhea. She states the pain is epigastric and does not radiate. Is no shortness of breath or any other symptoms. Nothing makes any better or any worse and she still has the pain now. She has a history of idiopathic pancreatitis about last month and this feels similar. She has a history of hysterectomy and cholecystectomy in the past    Nursing Notes were all reviewed and agreed with or any disagreements were addressed  in the HPI. REVIEW OF SYSTEMS    (2-9 systems for level 4, 10 or more for level 5)     Review of Systems   Constitutional: Negative. HENT: Negative. Eyes: Negative. Respiratory: Negative. Cardiovascular: Negative. Gastrointestinal: Positive for abdominal pain, nausea and vomiting. Endocrine: Negative. Genitourinary: Negative. Musculoskeletal: Negative. Skin: Negative. Neurological: Negative. Hematological: Negative. Psychiatric/Behavioral: Negative.         PAST MEDICAL HISTORY     Past Medical History:   Diagnosis Date    [facility] [address]     COPD (chronic obstructive pulmonary disease) (HCC)     Diabetes mellitus (HCC)     Hypertension     GARCIA (nonalcoholic steatohepatitis)     Pancreatitis     Prediabetes     RA (rheumatoid arthritis) (HCC)     Rheumatoid arthritis [facility])        SURGICAL HISTORY     Past Surgical History:   Procedure Laterality Date     SECTION      CHOLECYSTECTOMY      GALLBLADDER SURGERY      HYSTERECTOMY      TUBAL LIGATION         CURRENTMEDICATIONS       Previous Medications    ADALIMUMAB 40 MG/0.4ML PNKT    Inject 40 mg into the skin    ALBUTEROL SULFATE  (90 BASE) MCG/ACT INHALER        AMITRIPTYLINE (ELAVIL) 25 MG TABLET    TAKE 1 TABLET BY MOUTH EVERY DAY    DIAZEPAM (VALIUM) 5 MG TABLET    TAKE 1/2 TABLET BY MOUTH DAILY AS NEEDED    DICLOFENAC SODIUM (VOLTAREN) 1 % GEL    Apply 2 g topically 4 times daily    FLUTICASONE (FLONASE) 50 MCG/ACT NASAL SPRAY    SPRAY 2 SPRAY(S) EVERY DAY BY INTRANASAL ROUTE AS NEEDED.     GABAPENTIN (NEURONTIN) 100 MG CAPSULE    TAKE 1 CAPSULE BY MOUTH EVERYDAY AT BEDTIME    HYDROCORTISONE (ANUSOL-HC) 2.5 % CREA RECTAL CREAM    APPLY A THIN LAYER TO THE AFFECTED AREA(S) BY TOPICAL ROUTE 2 4 TIMES DAILY    IBUPROFEN (ADVIL;MOTRIN) 600 MG TABLET    Take 1 tablet by mouth every 6 hours as needed for Pain    LOSARTAN (COZAAR) 50 MG TABLET    TAKE 1 TABLET BY MOUTH EVERY DAY    ONDANSETRON (ZOFRAN ODT) 4 MG DISINTEGRATING TABLET    Take 1 tablet by mouth every 6 hours as needed for Nausea    SPIRIVA RESPIMAT 2.5 MCG/ACT AERS INHALER    TAKE 2 PUFFS BY MOUTH EVERY DAY       ALLERGIES     Sulfasalazine, Azithromycin, Fluoxetine, Hydrocodone-acetaminophen, Penicillins, Serotonin reuptake inhibitors (ssris), Venlafaxine, Clindamycin/lincomycin, and Keflex [cephalexin]    FAMILYHISTORY       Family History   Problem Relation Age of Onset    High Blood Pressure Mother     Other Mother         thyroid    Heart Disease Mother         SOCIAL HISTORY       Social History     Socioeconomic History    Marital status:      Spouse name: Not on file    Number of children: Not on file    Years of education: Not on file    Highest education level: Not on file   Occupational History    Not on file   Social Needs  Financial resource strain: Not on file   Summer-Param insecurity     Worry: Not on file     Inability: Not on file   D[employer] needs     Medical: Not on file     Non-medical: Not on file   Tobacco Use    Smoking status: Former Smoker     Packs/day: 0.50     Years: 30.00     Pack years: 15.00     Types: Cigarettes    Smokeless tobacco: Never Used   Substance and Sexual Activity    Alcohol use: No     Alcohol/week: 0.0 standard drinks    Drug use: No    Sexual activity: Not on file   Lifestyle    Physical activity     Days per week: Not on file     Minutes per session: Not on file    Stress: Not on file   Relationships    Social connections     Talks on phone: Not on file     Gets together: Not on file     Attends Buddhism service: Not on file     Active member of club or organization: Not on file     Attends meetings of clubs or organizations: Not on file     Relationship status: Not on file    Intimate partner violence     Fear of current or ex partner: Not on file     Emotionally abused: Not on file     Physically abused: Not on file     Forced sexual activity: Not on file   Other Topics Concern    Not on file   Social History Narrative    Not on file       SCREENINGS             PHYSICAL EXAM    (up to 7 for level 4, 8 or more for level 5)     ED Triage Vitals [03/07/21 1918]   BP Temp Temp Source Pulse Resp SpO2 Height Weight   (!) 144/82 98.4 °F (36.9 °C) Oral 91 16 -- 4' 11.5\" (1.511 m) --       Physical Exam  Vitals signs and nursing note reviewed. Constitutional:       Appearance: She is obese. HENT:      Head: Normocephalic and atraumatic. Mouth/Throat:      Mouth: Mucous membranes are moist.      Pharynx: Oropharynx is clear. Eyes:      Extraocular Movements: Extraocular movements intact. Cardiovascular:      Rate and Rhythm: Normal rate and regular rhythm. Pulmonary:      Effort: Pulmonary effort is normal.      Breath sounds: Normal breath sounds.    Abdominal:      General: Abdomen is protuberant. Bowel sounds are normal. There is no distension. Palpations: Abdomen is soft. Tenderness: There is abdominal tenderness in the epigastric area. Hernia: No hernia is present. Skin:     General: Skin is warm. Capillary Refill: Capillary refill takes less than 2 seconds. Neurological:      General: No focal deficit present. Mental Status: She is alert and oriented to person, place, and time. Psychiatric:         Mood and Affect: Mood normal.         Behavior: Behavior normal.         DIAGNOSTIC RESULTS   LABS:    Labs Reviewed   COMPREHENSIVE METABOLIC PANEL W/ REFLEX TO MG FOR LOW K - Abnormal; Notable for the following components:       Result Value    Glucose 152 (*)     Alkaline Phosphatase 144 (*)     ALT 67 (*)     AST 87 (*)     All other components within normal limits    Narrative:     Performed at:  [facility]  [address] [facility]   Phone [phone]   CBC WITH AUTO DIFFERENTIAL    Narrative:     Performed at:  [address] [place] [address]  [address] [facility]   Phone [phone]   LIPASE    Narrative:     Performed at:  [address] [place] [address]  [address] [facility]   Phone [phone]       All other labs were within normal range or not returned as of this dictation. EKG:       RADIOLOGY:        Interpretation per the Radiologist below, if available at the time of this note:    No orders to display     No results found.       PROCEDURES   Unless otherwise noted below, none     Procedures    CRITICAL CARE TIME   N/A    CONSULTS:  None      EMERGENCY DEPARTMENT COURSE and DIFFERENTIAL DIAGNOSIS/MDM:   Vitals:    Vitals:    03/07/21 1918   BP: (!) 144/82   Pulse: 91   Resp: 16   Temp: 98.4 °F (36.9 °C)   TempSrc: Oral   Height: 4' 11.5\" (1.511 m)       Patient was given thefollowing medications:  Medications   ondansetron (ZOFRAN) injection 4 mg (has no administration in time range)           This patient had a nonsurgical abdomen. She had a little bit of tenderness in the epigastrium. She did not have any focal tenderness to suggest appendicitis, cholecystitis, or bowel obstruction. She does not have tenderness to suggest diverticular disease. Her white blood cell count is normal and she is afebrile and nontoxic-appearing. She did not require any analgesia here in the emergency department although I offered at least twice. She states she is needing something for nausea. She tells me she has Percocet at home for pain. She had a CT scan just about a week and a half ago so I did not repeat this. It was normal then and because her abdominal exam is nonsurgical and she is not febrile or have a leukocytosis, I did not subject her to more radiation. I think she can be discharged home in good condition to use a liquid diet for the next 24 hours and return in 12 hours if pain is not better or sooner if she has worsening pain, persistent vomiting, or fever. She voices understanding and is pleased with the plan. FINAL IMPRESSION      1.  Abdominal pain, epigastric          DISPOSITION/PLAN   DISPOSITION Decision To Discharge 03/07/2021 08:38:02 PM      PATIENT REFERREDTO:  JEB Silva - CNP  [address] [id number]  [phone]    Call in 1 day        DISCHARGE MEDICATIONS:  New Prescriptions    No medications on file       DISCONTINUED MEDICATIONS:  Discontinued Medications    AMLODIPINE (NORVASC) 5 MG TABLET    Take 5 mg by mouth    HYDROCHLOROTHIAZIDE (HYDRODIURIL) 25 MG TABLET    Take 25 mg by mouth    HYDROXYZINE (VISTARIL) 25 MG CAPSULE    Take 25 mg by mouth    OMEGA-3 FATTY ACIDS (FISH OIL PO)    Take by mouth    PREDNISONE (DELTASONE) 20 MG TABLET    Take 3 tablets today then 2 tablets daily for 6 more days              (Please note that portions ofthis note were completed with a voice recognition program.  Efforts were made to edit the dictations but occasionally words are mis-transcribed.)    Julio C Barahona MD (electronically signed)              Julio C Barahona MD  03/07/21 2044

## 2021-03-08 NOTE — ED TRIAGE NOTES
History of pancreatitis, complains of  ''just a sick feeling all day, feeling so nauseated all day''  Denies pain.  No vomiting no diarrhea, did not take zofran today for nausea and sick feeling,  ''felt to sick to put anything in my mouth''

## 2022-03-16 ENCOUNTER — HOSPITAL ENCOUNTER (EMERGENCY)
Age: 57
Discharge: HOME | End: 2022-03-16
Attending: EMERGENCY MEDICINE
Payer: MEDICARE

## 2022-03-16 ENCOUNTER — APPOINTMENT (OUTPATIENT)
Dept: CT IMAGING | Age: 57
End: 2022-03-16
Payer: MEDICARE

## 2022-03-16 VITALS
TEMPERATURE: 98 F | SYSTOLIC BLOOD PRESSURE: 132 MMHG | WEIGHT: 220 LBS | BODY MASS INDEX: 43.19 KG/M2 | OXYGEN SATURATION: 96 % | RESPIRATION RATE: 15 BRPM | HEART RATE: 76 BPM | HEIGHT: 60 IN | DIASTOLIC BLOOD PRESSURE: 76 MMHG

## 2022-03-16 DIAGNOSIS — M54.12 CERVICAL RADICULOPATHY: Primary | ICD-10-CM

## 2022-03-16 PROCEDURE — 93005 ELECTROCARDIOGRAM TRACING: CPT | Performed by: EMERGENCY MEDICINE

## 2022-03-16 PROCEDURE — 72125 CT NECK SPINE W/O DYE: CPT

## 2022-03-16 PROCEDURE — 99283 EMERGENCY DEPT VISIT LOW MDM: CPT

## 2022-03-16 RX ORDER — PREDNISONE 20 MG/1
TABLET ORAL
Qty: 18 TABLET | Refills: 0 | Status: SHIPPED | OUTPATIENT
Start: 2022-03-16 | End: 2022-03-26

## 2022-03-16 ASSESSMENT — PAIN SCALES - GENERAL: PAINLEVEL_OUTOF10: 8

## 2022-03-16 ASSESSMENT — PAIN - FUNCTIONAL ASSESSMENT: PAIN_FUNCTIONAL_ASSESSMENT: 0-10

## 2022-03-16 ASSESSMENT — PAIN DESCRIPTION - ORIENTATION: ORIENTATION: RIGHT

## 2022-03-16 ASSESSMENT — PAIN DESCRIPTION - LOCATION: LOCATION: SHOULDER

## 2022-03-16 NOTE — ED PROVIDER NOTES
[facility]  eMERGENCY dEPARTMENT eNCOUnter      Pt Name: Bibi Angel  MRN: [id number]  [place] 1965  Date of evaluation: 3/16/2022  Provider: Jose Sharma MD    [address] [place] [address]       Chief Complaint   Patient presents with    Shoulder Pain     rt shoulder pain, hx of RA, x 3 wks. has an infusion appt april 20th         CRITICAL CARE TIME   Total Critical Care time was 0 minutes, excluding separately reportable procedures. There was a high probability of clinically significant/life threatening deterioration in the patient's condition which required my urgent intervention. HISTORY OF PRESENT ILLNESS  (Location/Symptom, Timing/Onset, Context/Setting, Quality, Duration, Modifying Factors, Severity.)   Bibi Angel is a 65 y.o. female who presents to the emergency department complaining of pain at the base of her right neck, right shoulder in the trapezius area and right arm. She has been having symptoms for 3 weeks. It is gotten gradually worse. She states the pain starts down in her hand on the top of her hand and goes up her arm into her shoulder into the base of her neck and her trapezius area. No injury. No numbness or tingling. She does have a history of rheumatoid arthritis. Because of some insurance issues she has not been able to get her normal infusions. She states she had bursitis in her shoulder and it feels similar. Nursing Notes were reviewed and I agree. REVIEW OF SYSTEMS    (2-9 systems for level 4, 10 or more for level 5)     HEENT: Negative. Cardiovascular: No chest pain. Pulmonary: No shortness of breath. GI: No abdominal pain. Musculoskeletal: A sharp pain going up her right arm starting in her hand through her shoulder into her neck to the base of her skull. Worse with movement. Neuro: No extremity weakness numbness or tingling. Except as noted above the remainder of the review of systems was reviewed and negative. PAST MEDICAL HISTORY     Past Medical History:   Diagnosis Date    [facility]) ([facility] [address]     COPD (chronic obstructive pulmonary disease) (HCC)     Diabetes mellitus (HCC)     Hypertension     GARCIA (nonalcoholic steatohepatitis)     Pancreatitis     Prediabetes     RA (rheumatoid arthritis) ([facility] [address]     [facility]          SURGICAL HISTORY       Past Surgical History:   Procedure Laterality Date     SECTION      CHOLECYSTECTOMY      GALLBLADDER SURGERY      HYSTERECTOMY      TUBAL LIGATION           CURRENT MEDICATIONS       Previous Medications    ADALIMUMAB 40 MG/0.4ML PNKT    Inject 40 mg into the skin    ALBUTEROL SULFATE  (90 BASE) MCG/ACT INHALER        AMITRIPTYLINE (ELAVIL) 25 MG TABLET    TAKE 1 TABLET BY MOUTH EVERY DAY    DIAZEPAM (VALIUM) 5 MG TABLET    TAKE 1/2 TABLET BY MOUTH DAILY AS NEEDED    DICLOFENAC SODIUM (VOLTAREN) 1 % GEL    Apply 2 g topically 4 times daily    FLUTICASONE (FLONASE) 50 MCG/ACT NASAL SPRAY    SPRAY 2 SPRAY(S) EVERY DAY BY INTRANASAL ROUTE AS NEEDED.     GABAPENTIN (NEURONTIN) 100 MG CAPSULE    TAKE 1 CAPSULE BY MOUTH EVERYDAY AT BEDTIME    HYDROCORTISONE (ANUSOL-HC) 2.5 % CREA RECTAL CREAM    APPLY A THIN LAYER TO THE AFFECTED AREA(S) BY TOPICAL ROUTE 2 4 TIMES DAILY    IBUPROFEN (ADVIL;MOTRIN) 600 MG TABLET    Take 1 tablet by mouth every 6 hours as needed for Pain    LOSARTAN (COZAAR) 50 MG TABLET    TAKE 1 TABLET BY MOUTH EVERY DAY    ONDANSETRON (ZOFRAN ODT) 4 MG DISINTEGRATING TABLET    Take 1 tablet by mouth every 6 hours as needed for Nausea    RITUXIMAB (RITUXAN IV)    Infuse intravenously    SPIRIVA RESPIMAT 2.5 MCG/ACT AERS INHALER    TAKE 2 PUFFS BY MOUTH EVERY DAY       ALLERGIES     Sulfasalazine, Azithromycin, Fluoxetine, Hydrocodone-acetaminophen, Penicillins, Serotonin reuptake inhibitors (ssris), Venlafaxine, Clindamycin/lincomycin, and Keflex [cephalexin]    FAMILY HISTORY       Family History   Problem Relation Age of Onset    High Blood Pressure Mother     Other Mother         thyroid    Heart Disease Mother           SOCIAL HISTORY       Social History     Socioeconomic History    Marital status:      Spouse name: Not on file    Number of children: Not on file    Years of education: Not on file    Highest education level: Not on file   Occupational History    Not on file   Tobacco Use    Smoking status: Former Smoker     Packs/day: 0.50     Years: 30.00     Pack years: 15.00     Types: Cigarettes    Smokeless tobacco: Never Used   Vaping Use    Vaping Use: Never used   Substance and Sexual Activity    Alcohol use: No     Alcohol/week: 0.0 standard drinks    Drug use: No    Sexual activity: Not on file   Other Topics Concern    Not on file   Social History Narrative    Not on file     Social Determinants of Health     Financial Resource Strain:     Difficulty of Paying Living Expenses: Not on file   Food Insecurity:     Worried About [employer] in the Last Year: Not on file    Julian of Food in the Last Year: Not on file   Transportation Needs:     Lack of Transportation (Medical): Not on file    Lack of Transportation (Non-Medical):  Not on file   Physical Activity:     Days of Exercise per Week: Not on file    Minutes of Exercise per Session: Not on file   Stress:     Feeling of Stress : Not on file   Social Connections:     Frequency of Communication with Friends and Family: Not on file    Frequency of Social Gatherings with Friends and Family: Not on file    Attends Gnosticism Services: Not on file    Active Member of Clubs or Organizations: Not on file    Attends Club or Organization Meetings: Not on file    Marital Status: Not on file   Intimate Partner Violence:     Fear of Current or Ex-Partner: Not on file    Emotionally Abused: Not on file    Physically Abused: Not on file    Sexually Abused: Not on file   Housing Stability:     Unable to Pay for Housing in the Last Year: Not on file    Number of Places Lived in the Last Year: Not on file    Unstable Housing in the Last Year: Not on file         PHYSICAL EXAM    (up to 7 for level 4, 8 or more for level 5)     ED Triage Vitals [03/16/22 1313]   BP Temp Temp Source Pulse Resp SpO2 Height Weight   132/76 98 °F (36.7 °C) Oral 76 15 96 % 5' (1.524 m) 220 lb (99.8 kg)       General: Alert moderately obese female no acute distress. Head: Atraumatic and normocephalic. Eyes: No conjunctival injection. Pupils equal round reactive. No pallor. ENT: Khoa Covert is clear. Oropharynx moist without erythema. Neck: Supple, nontender, no adenopathy. No thyromegaly. Heart: Regular rate and rhythm. No murmurs or gallops noted. Lungs: Breath sounds equal bilaterally and clear. Abdomen: Soft, nondistended, nontender. Musculoskeletal: She has some mild tenderness in the right trapezius area. No tenderness in the shoulder. She has full range of motion of the shoulder elbows and wrist bilaterally. Intact distal pulses. No edema. Symmetrical muscle strength in her upper extremities. Skin: Good turgor. No rash. Neuro: Symmetrical motor function upper and lower extremities. Normal gait. 1+ symmetrical triceps, biceps, brachial radialis reflexes. DIFFERENTIAL DIAGNOSIS   Differential includes but is not limited to cervical radiculopathy, cervical strain, right shoulder strain, bursitis, osteoarthritis of the shoulder, angina, myocardial infarction, other. DIAGNOSTIC RESULTS     EKG: All EKG's are interpreted by Fiorella Barker MD in the absence of a cardiologist.    Sinus rhythm, rate of 70, no acute ST-T wave changes. Rhythm strip shows sinus rhythm with a rate of 70, HI interval 124 ms, QRS 90 ms, with no other ectopy as interpreted by me. No old EKG available for comparison.     RADIOLOGY:   Non-plain film images such as CT, Ultrasound and MRI are read by the radiologist. Scott Jennings radiographic images are visualized and preliminarily interpreted Pooja Rasmussen MD with the below findings:      Interpretation per the Radiologist below, if available at the time of this note:    CT CERVICAL SPINE WO CONTRAST   Final Result   No acute spine abnormality. ED BEDSIDE ULTRASOUND:   Performed by ED Physician - none    LABS:  Labs Reviewed - No data to display    All other labs were within normal range or not returned as of this dictation. EMERGENCY DEPARTMENT COURSE and DIFFERENTIAL DIAGNOSIS/MDM:   Vitals:    Vitals:    03/16/22 1313   BP: 132/76   Pulse: 76   Resp: 15   Temp: 98 °F (36.7 °C)   TempSrc: Oral   SpO2: 96%   Weight: 220 lb (99.8 kg)   Height: 5' (1.524 m)       This patient presents with symptoms as above. Her pain goes from her arm up into her shoulder and to the base of her neck. She has no paresthesias or numbness. She has no neurologic deficits on exam.  She does have a history of rheumatoid arthritis. She has some degenerative changes but no other significant findings on CT of her cervical spine. Her EKG is normal.  I do not think her symptoms are cardiac in origin. I think this is likely a cervical radiculopathy. She will be put on a 9-day taper of prednisone. She will follow-up with her primary care provider in 5 to 7 days if not improved. She understands if her symptoms do not improve she may need further work-up including but not limited to an MRI. Test results, diagnosis, and treatment plan were discussed the patient. She understands treatment plan follow-up as discussed. CONSULTS:  None    PROCEDURES:  None    FINAL IMPRESSION      1.  Cervical radiculopathy          DISPOSITION/PLAN   DISPOSITION Decision To Discharge 03/16/2022 02:03:07 PM      PATIENT REFERRED TO:  JEB Tracey - CNP  [address] [facility]  [phone]    Schedule an appointment as soon as possible for a visit in 1 week        DISCHARGE MEDICATIONS:  New Prescriptions    PREDNISONE (DELTASONE) 20 MG TABLET    3 tabs po qam for 3 days then 2 tabs qam for 3 days the 1 tab qam for 3 days       (Please note that portions of this note were completed with a voice recognition program.  Efforts were made to edit the dictations but occasionally words are mis-transcribed.)    Fiorella Barker MD  Attending Emergency Physician        Stefano Armenta MD  03/16/22 2196

## 2022-03-17 LAB
EKG ATRIAL RATE: 70 BPM
EKG DIAGNOSIS: NORMAL
EKG P AXIS: 50 DEGREES
EKG P-R INTERVAL: 124 MS
EKG Q-T INTERVAL: 436 MS
EKG QRS DURATION: 90 MS
EKG QTC CALCULATION (BAZETT): 470 MS
EKG R AXIS: 63 DEGREES
EKG T AXIS: 76 DEGREES
EKG VENTRICULAR RATE: 70 BPM

## 2022-03-17 PROCEDURE — 93010 ELECTROCARDIOGRAM REPORT: CPT | Performed by: INTERNAL MEDICINE

## 2022-04-01 ENCOUNTER — APPOINTMENT (OUTPATIENT)
Dept: CT IMAGING | Age: 57
End: 2022-04-01
Payer: MEDICARE

## 2022-04-01 ENCOUNTER — HOSPITAL ENCOUNTER (EMERGENCY)
Age: 57
Discharge: HOME | End: 2022-04-01
Attending: EMERGENCY MEDICINE
Payer: MEDICARE

## 2022-04-01 VITALS
WEIGHT: 231.48 LBS | RESPIRATION RATE: 18 BRPM | DIASTOLIC BLOOD PRESSURE: 70 MMHG | SYSTOLIC BLOOD PRESSURE: 160 MMHG | HEIGHT: 60 IN | TEMPERATURE: 98.3 F | HEART RATE: 81 BPM | BODY MASS INDEX: 45.45 KG/M2 | OXYGEN SATURATION: 94 %

## 2022-04-01 DIAGNOSIS — R10.30 LOWER ABDOMINAL PAIN: Primary | ICD-10-CM

## 2022-04-01 LAB
A/G RATIO: 1 (ref 1.1–2.2)
ALBUMIN SERPL-MCNC: 3.6 G/DL (ref 3.4–5)
ALP BLD-CCNC: 113 U/L (ref 40–129)
ALT SERPL-CCNC: 27 U/L (ref 10–40)
ANION GAP SERPL CALCULATED.3IONS-SCNC: 12 MMOL/L (ref 3–16)
AST SERPL-CCNC: 31 U/L (ref 15–37)
BASOPHILS ABSOLUTE: 0 K/UL (ref 0–0.2)
BASOPHILS RELATIVE PERCENT: 0.7 %
BILIRUB SERPL-MCNC: 0.4 MG/DL (ref 0–1)
BILIRUBIN URINE: NEGATIVE
BLOOD, URINE: NEGATIVE
BUN BLDV-MCNC: 13 MG/DL (ref 7–20)
CALCIUM SERPL-MCNC: 9.6 MG/DL (ref 8.3–10.6)
CHLORIDE BLD-SCNC: 106 MMOL/L (ref 99–110)
CLARITY: CLEAR
CO2: 21 MMOL/L (ref 21–32)
COLOR: YELLOW
CREAT SERPL-MCNC: 0.9 MG/DL (ref 0.6–1.1)
EOSINOPHILS ABSOLUTE: 0.2 K/UL (ref 0–0.6)
EOSINOPHILS RELATIVE PERCENT: 4.3 %
GFR AFRICAN AMERICAN: >60
GFR NON-AFRICAN AMERICAN: >60
GLUCOSE BLD-MCNC: 123 MG/DL (ref 70–99)
GLUCOSE URINE: NEGATIVE MG/DL
HCT VFR BLD CALC: 40.7 % (ref 36–48)
HEMOGLOBIN: 13.3 G/DL (ref 12–16)
KETONES, URINE: NEGATIVE MG/DL
LACTIC ACID: 1.3 MMOL/L (ref 0.4–2)
LEUKOCYTE ESTERASE, URINE: NEGATIVE
LIPASE: 72 U/L (ref 13–60)
LYMPHOCYTES ABSOLUTE: 1.4 K/UL (ref 1–5.1)
LYMPHOCYTES RELATIVE PERCENT: 25.8 %
MCH RBC QN AUTO: 30.9 PG (ref 26–34)
MCHC RBC AUTO-ENTMCNC: 32.7 G/DL (ref 31–36)
MCV RBC AUTO: 94.7 FL (ref 80–100)
MICROSCOPIC EXAMINATION: ABNORMAL
MONOCYTES ABSOLUTE: 0.6 K/UL (ref 0–1.3)
MONOCYTES RELATIVE PERCENT: 10.8 %
NEUTROPHILS ABSOLUTE: 3.1 K/UL (ref 1.7–7.7)
NEUTROPHILS RELATIVE PERCENT: 58.4 %
NITRITE, URINE: NEGATIVE
PDW BLD-RTO: 12.4 % (ref 12.4–15.4)
PH UA: 6.5 (ref 5–8)
PLATELET # BLD: 157 K/UL (ref 135–450)
PMV BLD AUTO: 10.2 FL (ref 5–10.5)
POTASSIUM REFLEX MAGNESIUM: 4 MMOL/L (ref 3.5–5.1)
PROTEIN UA: NEGATIVE MG/DL
RBC # BLD: 4.3 M/UL (ref 4–5.2)
SODIUM BLD-SCNC: 139 MMOL/L (ref 136–145)
SPECIFIC GRAVITY UA: 1.02 (ref 1–1.03)
TOTAL PROTEIN: 7.3 G/DL (ref 6.4–8.2)
URINE REFLEX TO CULTURE: ABNORMAL
URINE TYPE: ABNORMAL
UROBILINOGEN, URINE: >=8 E.U./DL
WBC # BLD: 5.4 K/UL (ref 4–11)

## 2022-04-01 PROCEDURE — 83605 ASSAY OF LACTIC ACID: CPT

## 2022-04-01 PROCEDURE — 85025 COMPLETE CBC W/AUTO DIFF WBC: CPT

## 2022-04-01 PROCEDURE — 2580000003 HC RX 258: Performed by: EMERGENCY MEDICINE

## 2022-04-01 PROCEDURE — 6370000000 HC RX 637 (ALT 250 FOR IP): Performed by: EMERGENCY MEDICINE

## 2022-04-01 PROCEDURE — 6360000002 HC RX W HCPCS: Performed by: EMERGENCY MEDICINE

## 2022-04-01 PROCEDURE — 74177 CT ABD & PELVIS W/CONTRAST: CPT

## 2022-04-01 PROCEDURE — 36415 COLL VENOUS BLD VENIPUNCTURE: CPT

## 2022-04-01 PROCEDURE — 83690 ASSAY OF LIPASE: CPT

## 2022-04-01 PROCEDURE — 96374 THER/PROPH/DIAG INJ IV PUSH: CPT

## 2022-04-01 PROCEDURE — 99285 EMERGENCY DEPT VISIT HI MDM: CPT

## 2022-04-01 PROCEDURE — 6360000004 HC RX CONTRAST MEDICATION: Performed by: EMERGENCY MEDICINE

## 2022-04-01 PROCEDURE — 80053 COMPREHEN METABOLIC PANEL: CPT

## 2022-04-01 PROCEDURE — 81003 URINALYSIS AUTO W/O SCOPE: CPT

## 2022-04-01 RX ORDER — ACETAMINOPHEN 325 MG/1
650 TABLET ORAL ONCE
Status: COMPLETED | OUTPATIENT
Start: 2022-04-01 | End: 2022-04-01

## 2022-04-01 RX ORDER — 0.9 % SODIUM CHLORIDE 0.9 %
1000 INTRAVENOUS SOLUTION INTRAVENOUS ONCE
Status: COMPLETED | OUTPATIENT
Start: 2022-04-01 | End: 2022-04-01

## 2022-04-01 RX ORDER — KETOROLAC TROMETHAMINE 30 MG/ML
15 INJECTION, SOLUTION INTRAMUSCULAR; INTRAVENOUS ONCE
Status: COMPLETED | OUTPATIENT
Start: 2022-04-01 | End: 2022-04-01

## 2022-04-01 RX ADMIN — SODIUM CHLORIDE 1000 ML: 9 INJECTION, SOLUTION INTRAVENOUS at 20:13

## 2022-04-01 RX ADMIN — IOPAMIDOL 100 ML: 755 INJECTION, SOLUTION INTRAVENOUS at 20:50

## 2022-04-01 RX ADMIN — ACETAMINOPHEN 650 MG: 325 TABLET ORAL at 22:09

## 2022-04-01 RX ADMIN — KETOROLAC TROMETHAMINE 15 MG: 30 INJECTION, SOLUTION INTRAMUSCULAR at 20:13

## 2022-04-01 ASSESSMENT — PAIN SCALES - GENERAL
PAINLEVEL_OUTOF10: 6
PAINLEVEL_OUTOF10: 6
PAINLEVEL_OUTOF10: 8
PAINLEVEL_OUTOF10: 8

## 2022-04-01 ASSESSMENT — PAIN DESCRIPTION - DESCRIPTORS: DESCRIPTORS: DISCOMFORT

## 2022-04-01 ASSESSMENT — PAIN DESCRIPTION - PAIN TYPE: TYPE: ACUTE PAIN

## 2022-04-01 ASSESSMENT — PAIN DESCRIPTION - ORIENTATION: ORIENTATION: LOWER;LEFT;RIGHT

## 2022-04-01 ASSESSMENT — PAIN DESCRIPTION - LOCATION: LOCATION: ABDOMEN

## 2022-04-01 ASSESSMENT — PAIN - FUNCTIONAL ASSESSMENT: PAIN_FUNCTIONAL_ASSESSMENT: 0-10

## 2022-04-01 NOTE — ED PROVIDER NOTES
CHIEF COMPLAINT  Chief Complaint   Patient presents with    Abdominal Pain     c/o lower abd discomfort x 2 days/ denies any n/v/d or constipation/ denies any urinary symptoms or injury. HISTORY OF PRESENT ILLNESS  Kitty Valle is a 65 y.o. female who presents to the ED complaining of a 2-day history of lower abdominal pain, left lower quadrant and suprapubic area more than the right lower quadrant that is worse with movement. Sharp in quality. No radiation. No upper abdominal tenderness. No dysuria, hematuria, urinary frequency or urgency. No back pain. Intermittent chills but no tactile fevers at home. No vaginal bleeding or vaginal discharge. History of bilateral salpingo-oophorectomy and hysterectomy. History of cholecystectomy. History of Garcia liver disease. No nausea or vomiting. No diarrhea or bloody stool. No constipation. Patient has rheumatoid arthritis but has not been on any biologic agents at least for the last year. No other complaints, modifying factors or associated symptoms. Nursing notes reviewed.    Past Medical History:   Diagnosis Date    COPD (chronic obstructive pulmonary disease) (HCC)     COPD (chronic obstructive pulmonary disease) (HCC)     Diabetes mellitus (HCC)     Hypertension     GARCIA (nonalcoholic steatohepatitis)     Pancreatitis     Prediabetes     RA (rheumatoid arthritis) (HCC)     Rheumatoid arthritis (HCC)      Past Surgical History:   Procedure Laterality Date     SECTION      CHOLECYSTECTOMY      GALLBLADDER SURGERY      HYSTERECTOMY      TUBAL LIGATION       Family History   Problem Relation Age of Onset    High Blood Pressure Mother     Other Mother         thyroid    Heart Disease Mother      Social History     Socioeconomic History    Marital status:      Spouse name: Not on file    Number of children: Not on file    Years of education: Not on file    Highest education level: Not on file   Occupational History    Not on file   Tobacco Use    Smoking status: Former Smoker     Packs/day: 0.50     Years: 30.00     Pack years: 15.00     Types: Cigarettes    Smokeless tobacco: Never Used   Vaping Use    Vaping Use: Never used   Substance and Sexual Activity    Alcohol use: No     Alcohol/week: 0.0 standard drinks    Drug use: No    Sexual activity: Not Currently   Other Topics Concern    Not on file   Social History Narrative    Not on file     Social Determinants of Health     Financial Resource Strain:     Difficulty of Paying Living Expenses: Not on file   Food Insecurity:     Worried About Running Out of Food in the Last Year: Not on file    Julian of Food in the Last Year: Not on file   Transportation Needs:     Lack of Transportation (Medical): Not on file    Lack of Transportation (Non-Medical):  Not on file   Physical Activity:     Days of Exercise per Week: Not on file    Minutes of Exercise per Session: Not on file   Stress:     Feeling of Stress : Not on file   Social Connections:     Frequency of Communication with Friends and Family: Not on file    Frequency of Social Gatherings with Friends and Family: Not on file    Attends Mosque Services: Not on file    Active Member of [address] [place] [address] [employer] or Organizations: Not on file    Attends Club or Organization Meetings: Not on file    Marital Status: Not on file   Intimate Partner Violence:     Fear of Current or Ex-Partner: Not on file    Emotionally Abused: Not on file    Physically Abused: Not on file    Sexually Abused: Not on file   Housing Stability:     Unable to Pay for Housing in the Last Year: Not on file    Number of [place] in the Last Year: Not on file    Unstable Housing in the Last Year: Not on file     Current Facility-Administered Medications   Medication Dose Route Frequency Provider Last Rate Last Admin    acetaminophen (TYLENOL) tablet 650 mg  650 mg Oral Once Ruby Sims MD         Current Outpatient Medications   Medication Sig Dispense Refill    losartan (COZAAR) 50 MG tablet TAKE 1 TABLET BY MOUTH EVERY DAY      diazePAM (VALIUM) 5 MG tablet TAKE 1/2 TABLET BY MOUTH DAILY AS NEEDED      hydrocortisone (ANUSOL-HC) 2.5 % CREA rectal cream APPLY A THIN LAYER TO THE AFFECTED AREA(S) BY TOPICAL ROUTE 2 4 TIMES DAILY      gabapentin (NEURONTIN) 100 MG capsule TAKE 1 CAPSULE BY MOUTH EVERYDAY AT BEDTIME      amitriptyline (ELAVIL) 25 MG tablet TAKE 1 TABLET BY MOUTH EVERY DAY      diclofenac sodium (VOLTAREN) 1 % GEL Apply 2 g topically 4 times daily      riTUXimab (RITUXAN IV) Infuse intravenously      ondansetron (ZOFRAN ODT) 4 MG disintegrating tablet Take 1 tablet by mouth every 6 hours as needed for Nausea 12 tablet 0    Adalimumab 40 MG/0.4ML PNKT Inject 40 mg into the skin      albuterol sulfate  (90 Base) MCG/ACT inhaler       fluticasone (FLONASE) 50 MCG/ACT nasal spray SPRAY 2 SPRAY(S) EVERY DAY BY INTRANASAL ROUTE AS NEEDED.  SPIRIVA RESPIMAT 2.5 MCG/ACT AERS inhaler TAKE 2 PUFFS BY MOUTH EVERY DAY  0    ibuprofen (ADVIL;MOTRIN) 600 MG tablet Take 1 tablet by mouth every 6 hours as needed for Pain 60 tablet 0     Allergies   Allergen Reactions    Sulfasalazine      Pt has garrison liver disease. Told not to take sulfasalazine per Dr Lopez 84    Azithromycin Diarrhea    Fluoxetine      SUICIDAL THOUGHTS    Hydrocodone-Acetaminophen Itching    Penicillins     Serotonin Reuptake Inhibitors (Ssris)      diarrhea    Venlafaxine      BODY SHAKING    Clindamycin/Lincomycin Nausea And Vomiting    Keflex [Cephalexin] Nausea And Vomiting       REVIEW OF SYSTEMS  Positives and pertinent negatives as per HPI. Ten other systems were reviewed and are negative. Nursing notes pertaining to ROS were reviewed.           PHYSICAL EXAM   BP (!) 160/70   Pulse 81   Temp 98.3 °F (36.8 °C) (Tympanic)   Resp 18   Ht 5' (1.524 m)   Wt 231 lb 7.7 oz (105 kg)   LMP  (LMP Unknown)   SpO2 94%   BMI 45.21 kg/m²   General: Alert and oriented x 3, NAD. No increased work of breathing or accessory muscle use. Non-ill appearing. Appropriate and interactive  Eyes: PERRL, no scleral icterus, injection or exudate. EOMI. HENT: Atraumatic. Oral pharynx is clear, moist, no enanthem. No tonsillar hypertropy or exudate. Nasal mucous membranes are clear. TM's are clear without evidence of otitis media. Neck:  No Lymphadenopathy. Non-tender to palpation. Normal ROM. No JVD. No thyromegaly. No Mass. PULMONARY: Lungs clear bilaterally without wheezes, rales or rhonchi. Good air movement bilaterally. CV: Regular rate and rhythm without murmurs, rubs or gallops. ABD: Soft, tender to palpation in the left lower quadrant and suprapubic area but no upper abdominal tenderness and minimal to no right lower quadrant tenderness, non-distended, normal bowel sounds, no hepatosplenomegaly, no masses. No peritoneal signs, rebound or guarding. Negative psoas, obturator and Rovsing sign. No bruits. No pulsatile mass. Back:  No CVAT, no rash. EXT: No cyanosis or clubbing. No rash. CR < 2 seconds. No tenderness to palpation. No lower extremity edema. +2 pulses in upper/lower extremities bilaterally. Skin is warm and dry. PSYCH: normal affect      RADIOLOGY    CT ABDOMEN PELVIS W IV CONTRAST Additional Contrast? None   Final Result   Cirrhosis with findings of portal hypertension. No acute findings. No   ascites fluid. Uterus and gallbladder surgically absent. LABS  I have reviewed all labs for this visit.    Results for orders placed or performed during the hospital encounter of 04/01/22   CBC with Auto Differential   Result Value Ref Range    WBC 5.4 4.0 - 11.0 K/uL    RBC 4.30 4.00 - 5.20 M/uL    Hemoglobin 13.3 12.0 - 16.0 g/dL    Hematocrit 40.7 36.0 - 48.0 %    MCV 94.7 80.0 - 100.0 fL    MCH 30.9 26.0 - 34.0 pg    MCHC 32.7 31.0 - 36.0 g/dL    RDW 12.4 12.4 - 15.4 % Platelets 635 622 - 008 K/uL    MPV 10.2 5.0 - 10.5 fL    Neutrophils % 58.4 %    Lymphocytes % 25.8 %    Monocytes % 10.8 %    Eosinophils % 4.3 %    Basophils % 0.7 %    Neutrophils Absolute 3.1 1.7 - 7.7 K/uL    Lymphocytes Absolute 1.4 1.0 - 5.1 K/uL    Monocytes Absolute 0.6 0.0 - 1.3 K/uL    Eosinophils Absolute 0.2 0.0 - 0.6 K/uL    Basophils Absolute 0.0 0.0 - 0.2 K/uL   Comprehensive Metabolic Panel w/ Reflex to MG   Result Value Ref Range    Sodium 139 136 - 145 mmol/L    Potassium reflex Magnesium 4.0 3.5 - 5.1 mmol/L    Chloride 106 99 - 110 mmol/L    CO2 21 21 - 32 mmol/L    Anion Gap 12 3 - 16    Glucose 123 (H) 70 - 99 mg/dL    BUN 13 7 - 20 mg/dL    CREATININE 0.9 0.6 - 1.1 mg/dL    GFR Non-African American >60 >60    GFR African American >60 >60    Calcium 9.6 8.3 - 10.6 mg/dL    Total Protein 7.3 6.4 - 8.2 g/dL    Albumin 3.6 3.4 - 5.0 g/dL    Albumin/Globulin Ratio 1.0 (L) 1.1 - 2.2    Total Bilirubin 0.4 0.0 - 1.0 mg/dL    Alkaline Phosphatase 113 40 - 129 U/L    ALT 27 10 - 40 U/L    AST 31 15 - 37 U/L   Lipase   Result Value Ref Range    Lipase 72.0 (H) 13.0 - 60.0 U/L   Urinalysis with Reflex to Culture    Specimen: Urine   Result Value Ref Range    Color, UA Yellow Straw/Yellow    Clarity, UA Clear Clear    Glucose, Ur Negative Negative mg/dL    Bilirubin Urine Negative Negative    Ketones, Urine Negative Negative mg/dL    Specific Gravity, UA 1.025 1.005 - 1.030    Blood, Urine Negative Negative    pH, UA 6.5 5.0 - 8.0    Protein, UA Negative Negative mg/dL    Urobilinogen, Urine >=8.0 (A) <2.0 E.U./dL    Nitrite, Urine Negative Negative    Leukocyte Esterase, Urine Negative Negative    Microscopic Examination Not Indicated     Urine Type NotGiven     Urine Reflex to Culture Not Indicated    Lactic Acid   Result Value Ref Range    Lactic Acid 1.3 0.4 - 2.0 mmol/L           ED COURSE/MDM  Abdominal pain: Undifferentiated abdominal pain with no evidence of acute surgical abdomen.   CAT scan of the abdomen pelvis reveals no evidence of diverticulitis, perforation, obstruction, pancreatitis. Patient has surgical absence of her gallbladder and the uterus. Patient has no evidence of intra-abdominal abscess. Patient does have evidence of cirrhosis with portal hypertension which was known. Patient has no evidence of acute hernia. Cause of abdominal pain is uncertain at this time. Patient has no evidence of a urinary tract infection. Patient is afebrile. Patient was advised to return to emergency room for any worsening symptoms of persistent vomiting, hematemesis, bloody stool, persistent diarrhea, worsening abdominal pain or fever and will be referred to her family physician for reassessment in 3 days. I estimate there is LOW risk for ACUTE APPENDICITIS, BOWEL OBSTRUCTION, CHOLECYSTITIS, DIVERTICULITIS, STRANGULATED HERNIA, PANCREATITIS, AAA,  PELVIC INFLAMMATORY DISEASE, OVARIAN TORSION, PERFORATED BOWEL, PYELONEPHRITIS,  BOWEL ISCHEMIA, CARDIAC ISCHEMIA, ECTOPIC PREGNANCY, or TUBO-OVARIAN ABSCESS, thus I consider the discharge disposition reasonable. Also, there is no evidence or peritonitis, sepsis, or toxicity. We also discussed returning to the Emergency Department immediately if new or worsening symptoms occur. Hypertension:  Patient was hypertensive during the ER visit today. There are no signs of hypertensive emergency or evidence of end organ damage by history or physical exam.  These findings were discussed with the patient and advised to follow up with primary care physician to further assess and treat hypertension in the outpatient setting. The patient's blood pressure was found to be elevated according to CMS/Medicare and the Affordable Care Act/ObamaCare criteria. Elevated blood pressure could occur because of pain or anxiety or other reasons and does not mean that they need to have their blood pressure treated or medications otherwise adjusted.  However, this could also be a sign that they will need to have their blood pressure treated or medications changed. The patient was instructed to take a list of recent blood pressure readings to their next visit with their personal physician. Patient was given scripts for the following medications. I counseled patient how to take these medications. New Prescriptions    No medications on file         CLINICAL IMPRESSION  1. Lower abdominal pain        Blood pressure (!) 160/70, pulse 81, temperature 98.3 °F (36.8 °C), temperature source Tympanic, resp. rate 18, height 5' (1.524 m), weight 231 lb 7.7 oz (105 kg), SpO2 94 %, not currently breastfeeding.       Follow-up with:  JEB Pedroza - CNP  [address]  [phone]    In 3 days            Jean-Paul Wilson MD  04/01/22 1290

## 2022-04-11 ENCOUNTER — HOSPITAL ENCOUNTER (EMERGENCY)
Age: 57
Discharge: HOME | End: 2022-04-11
Attending: EMERGENCY MEDICINE
Payer: MEDICARE

## 2022-04-11 VITALS
HEART RATE: 94 BPM | HEIGHT: 60 IN | OXYGEN SATURATION: 94 % | BODY MASS INDEX: 44.58 KG/M2 | WEIGHT: 227.07 LBS | RESPIRATION RATE: 16 BRPM | SYSTOLIC BLOOD PRESSURE: 153 MMHG | TEMPERATURE: 98.8 F | DIASTOLIC BLOOD PRESSURE: 85 MMHG

## 2022-04-11 DIAGNOSIS — M06.9 RHEUMATOID ARTHRITIS OF BOTH WRISTS, UNSPECIFIED WHETHER RHEUMATOID FACTOR PRESENT (HCC): Primary | ICD-10-CM

## 2022-04-11 PROCEDURE — 6360000002 HC RX W HCPCS: Performed by: EMERGENCY MEDICINE

## 2022-04-11 PROCEDURE — 99283 EMERGENCY DEPT VISIT LOW MDM: CPT

## 2022-04-11 PROCEDURE — 96372 THER/PROPH/DIAG INJ SC/IM: CPT

## 2022-04-11 RX ORDER — METHYLPREDNISOLONE SODIUM SUCCINATE 125 MG/2ML
125 INJECTION, POWDER, LYOPHILIZED, FOR SOLUTION INTRAMUSCULAR; INTRAVENOUS ONCE
Status: COMPLETED | OUTPATIENT
Start: 2022-04-11 | End: 2022-04-11

## 2022-04-11 RX ORDER — METHYLPREDNISOLONE 4 MG/1
TABLET ORAL
Qty: 1 KIT | Refills: 0 | Status: SHIPPED | OUTPATIENT
Start: 2022-04-11 | End: 2022-06-11 | Stop reason: SDUPTHER

## 2022-04-11 RX ADMIN — METHYLPREDNISOLONE SODIUM SUCCINATE 125 MG: 125 INJECTION, POWDER, FOR SOLUTION INTRAMUSCULAR; INTRAVENOUS at 11:31

## 2022-04-11 ASSESSMENT — PAIN DESCRIPTION - DESCRIPTORS
DESCRIPTORS: ACHING
DESCRIPTORS: ACHING

## 2022-04-11 ASSESSMENT — PAIN DESCRIPTION - PAIN TYPE
TYPE: CHRONIC PAIN
TYPE: ACUTE PAIN

## 2022-04-11 ASSESSMENT — PAIN DESCRIPTION - FREQUENCY
FREQUENCY: CONTINUOUS
FREQUENCY: CONTINUOUS

## 2022-04-11 ASSESSMENT — PAIN SCALES - GENERAL
PAINLEVEL_OUTOF10: 7
PAINLEVEL_OUTOF10: 10

## 2022-04-11 ASSESSMENT — PAIN - FUNCTIONAL ASSESSMENT
PAIN_FUNCTIONAL_ASSESSMENT: 0-10
PAIN_FUNCTIONAL_ASSESSMENT: 0-10

## 2022-04-11 ASSESSMENT — PAIN DESCRIPTION - LOCATION
LOCATION: OTHER (COMMENT)
LOCATION: OTHER (COMMENT)

## 2022-04-11 NOTE — ED PROVIDER NOTES
eMERGENCY dEPARTMENT eNCOUnter      Pt Name: Shante Duran  MRN: [id number]  Maygfreginald 1965  Date of evaluation: 4/11/2022  Provider: Monique Kirby MD     [address] [place] [address]       Chief Complaint   Patient presents with    Other     c/o rheumatoid arthritis flare up. States, all her joints hurt. She has appointment next week with Rheumatology          HISTORY OF PRESENT ILLNESS   (Location/Symptom, Timing/Onset,Context/Setting, Quality, Duration, Modifying Factors, Severity) Note limiting factors. HPI    Shante Duran is a 65 y.o. female who presents to the emergency department with a flareup of her rheumatoid arthritis. Patient states she was unable to get to her rheumatologist until next week. Patient had bilateral hand swelling. Patient also unable to get her infusion. She has appointment next week. Patient complains of pain in her joints. Her hands are swelling. There is no shortness of breath no cough no fever. Been taking Tylenol without relief. Requested a steroid injection. Nursing Notes were reviewed. REVIEW OFSYSTEMS    (2+ for level 4; 10+ for level 5)   Review of Systems    General: No fevers, chills or night sweats, No weight loss    Head:  No Sore throat,  No Ear Pain    Chest:  Nontender. No Cough, No SOB,  Chest Pain    GI: No abdominal pain or vomiting    : No dysuria or hematuria    Musculoskeletal: No unrelenting pain or night pain    Neurologic: No bowel or bladder incontinence, No saddle anesthesia, No leg weakness    All other systems reviewed and are negative.         PAST MEDICAL HISTORY     Past Medical History:   Diagnosis Date    COPD (chronic obstructive pulmonary disease) ([address]     COPD (chronic obstructive pulmonary disease) (HCC)     Diabetes mellitus (HCC)     Hypertension     GARCIA (nonalcoholic steatohepatitis)     Pancreatitis     Prediabetes     RA (rheumatoid arthritis) ([address]     Rheumatoid arthritis ([facility]        SURGICAL HISTORY       Past Surgical History:   Procedure Laterality Date     SECTION      CHOLECYSTECTOMY      GALLBLADDER SURGERY      HYSTERECTOMY      TUBAL LIGATION         CURRENT MEDICATIONS       Discharge Medication List as of 2022 11:43 AM      CONTINUE these medications which have NOT CHANGED    Details   losartan (COZAAR) 50 MG tablet TAKE 1 TABLET BY MOUTH EVERY DAYHistorical Med      diazePAM (VALIUM) 5 MG tablet TAKE 1/2 TABLET BY MOUTH DAILY AS NEEDEDHistorical Med      gabapentin (NEURONTIN) 100 MG capsule TAKE 1 CAPSULE BY MOUTH EVERYDAY AT BEDTIMEHistorical Med      amitriptyline (ELAVIL) 25 MG tablet TAKE 1 TABLET BY MOUTH EVERY DAYHistorical Med      diclofenac sodium (VOLTAREN) 1 % GEL Apply 2 g topically 4 times daily, Topical, 4 TIMES DAILY Starting Wed 10/14/2020, Historical Med      riTUXimab (RITUXAN IV) Infuse intravenouslyHistorical Med      Adalimumab 40 MG/0.4ML PNKT Inject 40 mg into the skinHistorical Med      albuterol sulfate  (90 Base) MCG/ACT inhaler Historical Med      fluticasone (FLONASE) 50 MCG/ACT nasal spray SPRAY 2 SPRAY(S) EVERY DAY BY INTRANASAL ROUTE AS NEEDED. Historical Med      SPIRIVA RESPIMAT 2.5 MCG/ACT AERS inhaler TAKE 2 PUFFS BY MOUTH EVERY DAY, R-0, DAWHistorical Med      ibuprofen (ADVIL;MOTRIN) 600 MG tablet Take 1 tablet by mouth every 6 hours as needed for Pain, Disp-60 tablet, R-0Print             ALLERGIES     Sulfasalazine, Azithromycin, Fluoxetine, Hydrocodone-acetaminophen, Penicillins, Serotonin reuptake inhibitors (ssris), Venlafaxine, Clindamycin/lincomycin, and Keflex [cephalexin]    FAMILY HISTORY       Family History   Problem Relation Age of Onset    High Blood Pressure Mother     Other Mother         thyroid    Heart Disease Mother         SOCIAL HISTORY       Social History     Socioeconomic History    Marital status:      Spouse name: None    Number of children: None    Years of education: None    Highest education level: None Occupational History    None   Tobacco Use    Smoking status: Former Smoker     Packs/day: 0.50     Years: 30.00     Pack years: 15.00     Types: Cigarettes    Smokeless tobacco: Never Used   Vaping Use    Vaping Use: Never used   Substance and Sexual Activity    Alcohol use: No     Alcohol/week: 0.0 standard drinks    Drug use: No    Sexual activity: Not Currently   Other Topics Concern    None   Social History Narrative    None     Social Determinants of Health     Financial Resource Strain:     Difficulty of Paying Living Expenses: Not on file   Food Insecurity:     Worried About Running Out of Food in the Last Year: Not on file    Julian of Food in the Last Year: Not on file   Transportation Needs:     Lack of Transportation (Medical): Not on file    Lack of Transportation (Non-Medical):  Not on file   Physical Activity:     Days of Exercise per Week: Not on file    Minutes of Exercise per Session: Not on file   Stress:     Feeling of Stress : Not on file   Social Connections:     Frequency of Communication with Friends and Family: Not on file    Frequency of Social Gatherings with Friends and Family: Not on file    Attends Nondenominational Services: Not on file    Active Member of [address] [place] [address] [employer] or Organizations: Not on file    Attends Club or Organization Meetings: Not on file    Marital Status: Not on file   Intimate Partner Violence:     Fear of Current or Ex-Partner: Not on file    Emotionally Abused: Not on file    Physically Abused: Not on file    Sexually Abused: Not on file   Housing Stability:     Unable to Pay for Housing in the Last Year: Not on file    Number of [place] in the Last Year: Not on file    Unstable Housing in the Last Year: Not on file       SCREENINGS    [place] Coma Scale  Eye Opening: Spontaneous  Best Verbal Response: Oriented  Best Motor Response: Obeys commands  [place] Coma Scale Score: 15      PHYSICAL EXAM    (up to 7 for level 4, 8 or more for level 5) ED Triage Vitals [04/11/22 1056]   BP Temp Temp Source Pulse Resp SpO2 Height Weight   (!) 153/85 98.8 °F (37.1 °C) Tympanic 94 16 94 % 5' (1.524 m) 227 lb 1.2 oz (103 kg)       Physical Exam    General: Alert and awake ×3. Nontoxic appearance. Well-developed well-nourished 60-year-old in no acute distress  HEENT: Normocephalic atraumatic. Neck is supple. Airway intact. No adenopathy  Cardiac: Regular rate and rhythm with no murmurs rubs or gallops  Pulmonary: Lungs are clear in all lung fields. No wheezing. No Rales. Abdomen: Soft and nontender. Negative hepatosplenomegaly. Bowel sounds are active. Extremities: Moving all extremities. No calf tenderness. Peripheral pulses all intact  Skin: No skin lesions. No rashes  Neurologic: Cranial nerves II through XII was grossly intact. Nonfocal neurological exam  Psychiatric: Patient is pleasant. Mood is appropriate. DIAGNOSTIC RESULTS     EKG (Per Emergency Physician):       RADIOLOGY (Per Emergency Physician): Interpretation per the Radiologist below, if available at the time of this note:  No results found. ED BEDSIDE ULTRASOUND:   Performed by ED Physician - none    LABS:  Labs Reviewed - No data to display     All other labs were within normal range or not returned as of this dictation. Procedures      EMERGENCY DEPARTMENT COURSE and DIFFERENTIAL DIAGNOSIS/MDM:   Vitals:    Vitals:    04/11/22 1056   BP: (!) 153/85   Pulse: 94   Resp: 16   Temp: 98.8 °F (37.1 °C)   TempSrc: Tympanic   SpO2: 94%   Weight: 227 lb 1.2 oz (103 kg)   Height: 5' (1.524 m)       Medications   methylPREDNISolone sodium (SOLU-MEDROL) injection 125 mg (125 mg IntraMUSCular Given 4/11/22 1131)       MDM.      60-year-old with exacerbation of rheumatoid arthritis. Patient has hands swollen. Joints in her shoulder as well. There is no injury. Exam is reassuring. Moderate swelling noted.   Patient given a Solu-Medrol 125 mg I injection and placed on Medrol Dosepak. Follow-up with rheumatologist next week. REVAL:         CRITICAL CARE TIME   Total CriticalCare time was 0 minutes, excluding separately reportable procedures. There was a high probability of clinically significant/life threatening deterioration in the patient's condition which required my urgent intervention. CONSULTS:  None    PROCEDURES:  Unless otherwise noted below, none     [unfilled]    FINAL IMPRESSION      1. Rheumatoid arthritis of both wrists, unspecified whether rheumatoid factor present ([facility]          DISPOSITION/PLAN   DISPOSITION        PATIENT REFERRED TO:  Rheumatologist    Schedule an appointment as soon as possible for a visit in 1 week  If symptoms worsen      DISCHARGE MEDICATIONS:  Discharge Medication List as of 4/11/2022 11:43 AM      START taking these medications    Details   methylPREDNISolone (MEDROL, KP,) 4 MG tablet Take as directed, Disp-1 kit, R-0Normal                (Please note:  Portions of this note were completed with a voice recognition program.Efforts were made to edit the dictations but occasionally words and phrases are mis-transcribed.)  Form v2016. J.5-cn    Brian MAYA MD (electronically signed)  Emergency Medicine Provider        Syeda Andrews MD  04/11/22 7785

## 2022-04-11 NOTE — ED NOTES
Gave patient discharge instructions. She states, understanding.  Patient discharged to home      Lise Seth RN  04/11/22 0851

## 2022-06-11 ENCOUNTER — HOSPITAL ENCOUNTER (EMERGENCY)
Age: 57
Discharge: HOME | End: 2022-06-11
Attending: EMERGENCY MEDICINE
Payer: MEDICARE

## 2022-06-11 ENCOUNTER — APPOINTMENT (OUTPATIENT)
Dept: GENERAL RADIOLOGY | Age: 57
End: 2022-06-11
Payer: MEDICARE

## 2022-06-11 VITALS
OXYGEN SATURATION: 95 % | WEIGHT: 229.28 LBS | BODY MASS INDEX: 45.01 KG/M2 | SYSTOLIC BLOOD PRESSURE: 149 MMHG | HEART RATE: 94 BPM | DIASTOLIC BLOOD PRESSURE: 87 MMHG | TEMPERATURE: 99 F | HEIGHT: 60 IN | RESPIRATION RATE: 18 BRPM

## 2022-06-11 DIAGNOSIS — M79.605 LEFT LEG PAIN: Primary | ICD-10-CM

## 2022-06-11 PROCEDURE — 96372 THER/PROPH/DIAG INJ SC/IM: CPT

## 2022-06-11 PROCEDURE — 99284 EMERGENCY DEPT VISIT MOD MDM: CPT

## 2022-06-11 PROCEDURE — 73562 X-RAY EXAM OF KNEE 3: CPT

## 2022-06-11 PROCEDURE — 6360000002 HC RX W HCPCS: Performed by: EMERGENCY MEDICINE

## 2022-06-11 RX ORDER — METHYLPREDNISOLONE SODIUM SUCCINATE 125 MG/2ML
80 INJECTION, POWDER, LYOPHILIZED, FOR SOLUTION INTRAMUSCULAR; INTRAVENOUS ONCE
Status: COMPLETED | OUTPATIENT
Start: 2022-06-11 | End: 2022-06-11

## 2022-06-11 RX ORDER — METHYLPREDNISOLONE 4 MG/1
TABLET ORAL
Qty: 1 KIT | Refills: 0 | Status: SHIPPED | OUTPATIENT
Start: 2022-06-11 | End: 2022-09-10 | Stop reason: ALTCHOICE

## 2022-06-11 RX ADMIN — METHYLPREDNISOLONE SODIUM SUCCINATE 80 MG: 125 INJECTION, POWDER, FOR SOLUTION INTRAMUSCULAR; INTRAVENOUS at 19:27

## 2022-06-11 ASSESSMENT — PAIN - FUNCTIONAL ASSESSMENT: PAIN_FUNCTIONAL_ASSESSMENT: 0-10

## 2022-06-11 ASSESSMENT — PAIN DESCRIPTION - DESCRIPTORS: DESCRIPTORS: ACHING

## 2022-06-11 ASSESSMENT — PAIN DESCRIPTION - PAIN TYPE: TYPE: ACUTE PAIN

## 2022-06-11 ASSESSMENT — PAIN DESCRIPTION - DIRECTION: RADIATING_TOWARDS: KNEE TO FOOT

## 2022-06-11 ASSESSMENT — PAIN DESCRIPTION - LOCATION: LOCATION: KNEE

## 2022-06-11 ASSESSMENT — PAIN SCALES - GENERAL
PAINLEVEL_OUTOF10: 10
PAINLEVEL_OUTOF10: 10

## 2022-06-11 ASSESSMENT — PAIN DESCRIPTION - ORIENTATION: ORIENTATION: LEFT

## 2022-06-11 ASSESSMENT — PAIN DESCRIPTION - ONSET: ONSET: ON-GOING

## 2022-06-11 ASSESSMENT — PAIN DESCRIPTION - FREQUENCY: FREQUENCY: CONTINUOUS

## 2022-06-11 NOTE — ED TRIAGE NOTES
Pt from home. Drove herself to the ED to be seen for Left knee pain. Pt presents A&Ox4, walking independently and on room air. She is accompanied by her two granddaughters at bedside. Pt states that her left knee has been hurting for a week, and it feels different than her Rheumatiod Arthritis. She states 10/10 pain and its worse when bending it. Goals, fall prevention and safety have been reviewed with the pt who acknowledges understanding. Bed locked. Lowered. Call light in reach. No further questions or needs made known at this time.

## 2022-06-12 NOTE — ED PROVIDER NOTES
[facility] Emergency Department      Pt Name: Lien Mercedes  MRN: [id number]  [place] 1965  Date of evaluation: 2022  Provider: Madison Cox MD  CHIEF COMPLAINT  Chief Complaint   Patient presents with    Knee Pain     Left knee pain x 1 week. Pt states she has RA but \" this does not feel like that. \" No history of Blood clots. denies falls or known Injury. hurts worse when bending      HPI  Lien Mercedes is a 65 y.o. female who presents because of left knee pain. She has felt it for one week from behind her knee to her foot. It hurts worse when she tries to bend her knee and extend her leg. She has a history of rheumatoid arthritis though this feels different than her typical pain. She has no history of DVT but has concerns for that possibility. She admits to sob with exertion and has COPD. REVIEW OF SYSTEMS:  No injury, some buttock and upper leg pain with extension, no numbness, no weakness  Pertinent positives and negatives as per the HPI. All other review of systems reviewed and negative. Nursing notes reviewed. PAST MEDICAL HISTORY  Past Medical History:   Diagnosis Date    [facility]     COPD (chronic obstructive pulmonary disease) (HCC)     Diabetes mellitus (HCC)     Hypertension     GARCIA (nonalcoholic steatohepatitis)     Pancreatitis     Prediabetes     RA (rheumatoid arthritis) (HCC)     Rheumatoid arthritis ([facility]      SURGICAL HISTORY  Past Surgical History:   Procedure Laterality Date     SECTION      CHOLECYSTECTOMY      GALLBLADDER SURGERY      HYSTERECTOMY      TUBAL LIGATION       MEDICATIONS:  No current facility-administered medications on file prior to encounter.      Current Outpatient Medications on File Prior to Encounter   Medication Sig Dispense Refill    losartan (COZAAR) 50 MG tablet TAKE 1 TABLET BY MOUTH EVERY DAY      diazePAM (VALIUM) 5 MG tablet TAKE 1/2 TABLET BY MOUTH DAILY AS NEEDED      gabapentin (NEURONTIN) 100 MG capsule TAKE 1 CAPSULE BY MOUTH EVERYDAY AT BEDTIME      amitriptyline (ELAVIL) 25 MG tablet TAKE 1 TABLET BY MOUTH EVERY DAY      diclofenac sodium (VOLTAREN) 1 % GEL Apply 2 g topically 4 times daily      riTUXimab (RITUXAN IV) Infuse intravenously      Adalimumab 40 MG/0.4ML PNKT Inject 40 mg into the skin      albuterol sulfate  (90 Base) MCG/ACT inhaler       fluticasone (FLONASE) 50 MCG/ACT nasal spray SPRAY 2 SPRAY(S) EVERY DAY BY INTRANASAL ROUTE AS NEEDED.  SPIRIVA RESPIMAT 2.5 MCG/ACT AERS inhaler TAKE 2 PUFFS BY MOUTH EVERY DAY  0    ibuprofen (ADVIL;MOTRIN) 600 MG tablet Take 1 tablet by mouth every 6 hours as needed for Pain 60 tablet 0     ALLERGIES  Sulfasalazine, Azithromycin, Fluoxetine, Hydrocodone-acetaminophen, Penicillins, Serotonin reuptake inhibitors (ssris), Venlafaxine, Clindamycin/lincomycin, and Keflex [cephalexin]  SOCIAL HISTORY:  Social History     Tobacco Use    Smoking status: Former Smoker     Packs/day: 0.50     Years: 30.00     Pack years: 15.00     Types: Cigarettes    Smokeless tobacco: Never Used   Vaping Use    Vaping Use: Never used   Substance Use Topics    Alcohol use: No     Alcohol/week: 0.0 standard drinks    Drug use: No     IMMUNIZATIONS:  Noncontributory    PHYSICAL EXAM  VITAL SIGNS:  Blood pressure (!) 149/87, pulse 94, temperature 99 °F (37.2 °C), temperature source Tympanic, resp. rate 18, height 5' (1.524 m), weight 229 lb 4.5 oz (104 kg), SpO2 95 %, not currently breastfeeding.   Constitutional:  65 y.o. female who does not appear toxic  HENT:  Atraumatic, mucous membranes moist  Eyes:   Conjunctiva clear, no icterus  Neck:  Supple, no signs of injury  Thorax & Lungs:  Respiratory effort normal  Abdomen:  Non distended  Back:  No deformity  Extremity:  LLE appears normal, able to flex and extend knee, straight leg raise is positive, pedal pulses intact, no palpable venous cords  Skin:  Warm, dry    DIAGNOSTIC RESULTS:    RADIOLOGY:    Plain x-rays were viewed by me:   XR KNEE LEFT (3 VIEWS)   Final Result   Osteoarthropathy predominates in the medial compartment. Small effusion. VL Extremity Venous Left    (Results Pending)     ED COURSE:    Medications administered:  Medications   methylPREDNISolone sodium (SOLU-MEDROL) injection 80 mg (80 mg IntraMUSCular Given 6/11/22 1927)     PROCEDURES:  None    CONSULTATIONS:  None    MEDICAL DECISION MAKING: Brett Contreras is a 65 y.o. female who presented because of a painful leg. She is here during timeframe that we are not able to obtain official Doppler ultrasound of her leg. I also consider nerve pain. Pt does have a small joint effusion with arthritic changes on x ray. We advised her to go to the hospital during hours when the test is available to get the ultrasound on her leg, though clinically I think DVT is unlikely. Doubt vascular occlusion or dissection, compartment syndrome, infectious process, fracture, etc.  Other differentials include but not limited to peripheral neuropathic pain, strain, sprain, arthritis, bursitis, tendonitis, contusion, spasm, radiculopathy. Brett Contreras was given appropriate discharge instructions. Referral to follow up provider. Discharge Medication List as of 6/11/2022  7:22 PM   Medrol dose pack     FOLLOW UP:    Juan Everett, JEB - CNP  [address]  [phone]          FINAL IMPRESSION:    1. Left leg pain        (Please note that I used voice recognition software to generate this note.   Occasionally words are mistranscribed despite my efforts to edit errors.)        Abdifatah Flores MD  06/11/22 2045

## 2022-09-02 LAB — SARS-COV-2: DETECTED

## 2022-09-10 ENCOUNTER — HOSPITAL ENCOUNTER (INPATIENT)
Age: 57
LOS: 1 days | Discharge: HOME | DRG: 177 | End: 2022-09-12
Attending: EMERGENCY MEDICINE | Admitting: FAMILY MEDICINE
Payer: MEDICARE

## 2022-09-10 ENCOUNTER — APPOINTMENT (OUTPATIENT)
Dept: GENERAL RADIOLOGY | Age: 57
DRG: 177 | End: 2022-09-10
Payer: MEDICARE

## 2022-09-10 DIAGNOSIS — R09.02 HYPOXIA: ICD-10-CM

## 2022-09-10 DIAGNOSIS — U07.1 COVID: ICD-10-CM

## 2022-09-10 DIAGNOSIS — Z87.09 HISTORY OF COPD: ICD-10-CM

## 2022-09-10 DIAGNOSIS — F41.8 MIXED ANXIETY DEPRESSIVE DISORDER: Primary | ICD-10-CM

## 2022-09-10 DIAGNOSIS — D72.819 LEUKOPENIA, UNSPECIFIED TYPE: ICD-10-CM

## 2022-09-10 DIAGNOSIS — R79.89 ABNORMAL LFTS: ICD-10-CM

## 2022-09-10 PROBLEM — J18.9 PNEUMONIA: Status: ACTIVE | Noted: 2022-09-10

## 2022-09-10 LAB
A/G RATIO: 1.1 (ref 1.1–2.2)
ALBUMIN SERPL-MCNC: 3.4 G/DL (ref 3.4–5)
ALP BLD-CCNC: 95 U/L (ref 40–129)
ALT SERPL-CCNC: 73 U/L (ref 10–40)
ANION GAP SERPL CALCULATED.3IONS-SCNC: 9 MMOL/L (ref 3–16)
AST SERPL-CCNC: 183 U/L (ref 15–37)
BASOPHILS ABSOLUTE: 0 K/UL (ref 0–0.2)
BASOPHILS RELATIVE PERCENT: 0.4 %
BILIRUB SERPL-MCNC: 0.7 MG/DL (ref 0–1)
BUN BLDV-MCNC: 9 MG/DL (ref 7–20)
CALCIUM SERPL-MCNC: 8.7 MG/DL (ref 8.3–10.6)
CHLORIDE BLD-SCNC: 104 MMOL/L (ref 99–110)
CO2: 26 MMOL/L (ref 21–32)
CREAT SERPL-MCNC: 0.8 MG/DL (ref 0.6–1.1)
EOSINOPHILS ABSOLUTE: 0 K/UL (ref 0–0.6)
EOSINOPHILS RELATIVE PERCENT: 1.1 %
GFR AFRICAN AMERICAN: >60
GFR NON-AFRICAN AMERICAN: >60
GLUCOSE BLD-MCNC: 115 MG/DL (ref 70–99)
HCT VFR BLD CALC: 43.4 % (ref 36–48)
HEMOGLOBIN: 14 G/DL (ref 12–16)
IMMATURE GRANULOCYTES #: 0 K/UL (ref 0–0.2)
IMMATURE GRANULOCYTES %: 0.7 %
LACTIC ACID: 1.5 MMOL/L (ref 0.4–2)
LYMPHOCYTES ABSOLUTE: 1.2 K/UL (ref 1–5.1)
LYMPHOCYTES RELATIVE PERCENT: 42 %
MCH RBC QN AUTO: 29.6 PG (ref 26–34)
MCHC RBC AUTO-ENTMCNC: 32.3 G/DL (ref 32–36.4)
MCV RBC AUTO: 91.8 FL (ref 80–100)
MONOCYTES ABSOLUTE: 0.4 K/UL (ref 0–1.3)
MONOCYTES RELATIVE PERCENT: 14.5 %
NEUTROPHILS ABSOLUTE: 1.1 K/UL (ref 1.7–7.7)
NEUTROPHILS RELATIVE PERCENT: 41.3 %
PDW BLD-RTO: 13.7 % (ref 12.4–15.4)
PLATELET # BLD: 134 K/UL (ref 135–450)
PMV BLD AUTO: 11.1 FL (ref 5–10.5)
POTASSIUM SERPL-SCNC: 3.8 MMOL/L (ref 3.5–5.1)
PROCALCITONIN: 0.17 NG/ML (ref 0–0.15)
RBC # BLD: 4.73 M/UL (ref 4–5.2)
SODIUM BLD-SCNC: 139 MMOL/L (ref 136–145)
TOTAL PROTEIN: 6.6 G/DL (ref 6.4–8.2)
TROPONIN: <0.01 NG/ML
WBC # BLD: 2.8 K/UL (ref 4–11)

## 2022-09-10 PROCEDURE — 36415 COLL VENOUS BLD VENIPUNCTURE: CPT

## 2022-09-10 PROCEDURE — 6370000000 HC RX 637 (ALT 250 FOR IP): Performed by: EMERGENCY MEDICINE

## 2022-09-10 PROCEDURE — G0378 HOSPITAL OBSERVATION PER HR: HCPCS

## 2022-09-10 PROCEDURE — 84145 PROCALCITONIN (PCT): CPT

## 2022-09-10 PROCEDURE — 80053 COMPREHEN METABOLIC PANEL: CPT

## 2022-09-10 PROCEDURE — 96375 TX/PRO/DX INJ NEW DRUG ADDON: CPT

## 2022-09-10 PROCEDURE — 71045 X-RAY EXAM CHEST 1 VIEW: CPT

## 2022-09-10 PROCEDURE — 96361 HYDRATE IV INFUSION ADD-ON: CPT

## 2022-09-10 PROCEDURE — 87040 BLOOD CULTURE FOR BACTERIA: CPT

## 2022-09-10 PROCEDURE — 83605 ASSAY OF LACTIC ACID: CPT

## 2022-09-10 PROCEDURE — 99285 EMERGENCY DEPT VISIT HI MDM: CPT

## 2022-09-10 PROCEDURE — 2580000003 HC RX 258: Performed by: EMERGENCY MEDICINE

## 2022-09-10 PROCEDURE — 84484 ASSAY OF TROPONIN QUANT: CPT

## 2022-09-10 PROCEDURE — 96374 THER/PROPH/DIAG INJ IV PUSH: CPT

## 2022-09-10 PROCEDURE — 85025 COMPLETE CBC W/AUTO DIFF WBC: CPT

## 2022-09-10 PROCEDURE — 6360000002 HC RX W HCPCS: Performed by: EMERGENCY MEDICINE

## 2022-09-10 RX ORDER — SODIUM CHLORIDE 0.9 % (FLUSH) 0.9 %
5-40 SYRINGE (ML) INJECTION PRN
Status: DISCONTINUED | OUTPATIENT
Start: 2022-09-10 | End: 2022-09-12 | Stop reason: HOSPADM

## 2022-09-10 RX ORDER — 0.9 % SODIUM CHLORIDE 0.9 %
500 INTRAVENOUS SOLUTION INTRAVENOUS ONCE
Status: COMPLETED | OUTPATIENT
Start: 2022-09-11 | End: 2022-09-11

## 2022-09-10 RX ORDER — POLYETHYLENE GLYCOL 3350 17 G/17G
17 POWDER, FOR SOLUTION ORAL DAILY PRN
Status: DISCONTINUED | OUTPATIENT
Start: 2022-09-10 | End: 2022-09-12 | Stop reason: HOSPADM

## 2022-09-10 RX ORDER — METHYLPREDNISOLONE SODIUM SUCCINATE 125 MG/2ML
125 INJECTION, POWDER, LYOPHILIZED, FOR SOLUTION INTRAMUSCULAR; INTRAVENOUS ONCE
Status: COMPLETED | OUTPATIENT
Start: 2022-09-10 | End: 2022-09-10

## 2022-09-10 RX ORDER — SODIUM CHLORIDE 0.9 % (FLUSH) 0.9 %
5-40 SYRINGE (ML) INJECTION EVERY 12 HOURS SCHEDULED
Status: DISCONTINUED | OUTPATIENT
Start: 2022-09-11 | End: 2022-09-12 | Stop reason: HOSPADM

## 2022-09-10 RX ORDER — DIAZEPAM 2 MG/1
2 TABLET ORAL DAILY PRN
Status: DISCONTINUED | OUTPATIENT
Start: 2022-09-10 | End: 2022-09-12 | Stop reason: HOSPADM

## 2022-09-10 RX ORDER — ACETAMINOPHEN 650 MG/1
650 SUPPOSITORY RECTAL EVERY 6 HOURS PRN
Status: DISCONTINUED | OUTPATIENT
Start: 2022-09-10 | End: 2022-09-12 | Stop reason: HOSPADM

## 2022-09-10 RX ORDER — ENOXAPARIN SODIUM 100 MG/ML
40 INJECTION SUBCUTANEOUS DAILY
Status: DISCONTINUED | OUTPATIENT
Start: 2022-09-11 | End: 2022-09-11 | Stop reason: DRUGHIGH

## 2022-09-10 RX ORDER — ONDANSETRON 4 MG/1
4 TABLET, ORALLY DISINTEGRATING ORAL EVERY 8 HOURS PRN
Status: DISCONTINUED | OUTPATIENT
Start: 2022-09-10 | End: 2022-09-12 | Stop reason: HOSPADM

## 2022-09-10 RX ORDER — ONDANSETRON 2 MG/ML
4 INJECTION INTRAMUSCULAR; INTRAVENOUS EVERY 6 HOURS PRN
Status: DISCONTINUED | OUTPATIENT
Start: 2022-09-10 | End: 2022-09-12 | Stop reason: HOSPADM

## 2022-09-10 RX ORDER — AZITHROMYCIN 250 MG/1
250 TABLET, FILM COATED ORAL DAILY
Status: DISCONTINUED | OUTPATIENT
Start: 2022-09-11 | End: 2022-09-12 | Stop reason: HOSPADM

## 2022-09-10 RX ORDER — IPRATROPIUM BROMIDE AND ALBUTEROL SULFATE 2.5; .5 MG/3ML; MG/3ML
1 SOLUTION RESPIRATORY (INHALATION) ONCE
Status: COMPLETED | OUTPATIENT
Start: 2022-09-10 | End: 2022-09-10

## 2022-09-10 RX ORDER — 0.9 % SODIUM CHLORIDE 0.9 %
500 INTRAVENOUS SOLUTION INTRAVENOUS ONCE
Status: COMPLETED | OUTPATIENT
Start: 2022-09-10 | End: 2022-09-10

## 2022-09-10 RX ORDER — SODIUM CHLORIDE 9 MG/ML
INJECTION, SOLUTION INTRAVENOUS PRN
Status: DISCONTINUED | OUTPATIENT
Start: 2022-09-10 | End: 2022-09-12 | Stop reason: HOSPADM

## 2022-09-10 RX ORDER — DEXAMETHASONE 6 MG/1
6 TABLET ORAL DAILY
Status: DISCONTINUED | OUTPATIENT
Start: 2022-09-11 | End: 2022-09-12 | Stop reason: HOSPADM

## 2022-09-10 RX ORDER — GABAPENTIN 100 MG/1
100 CAPSULE ORAL NIGHTLY
Status: DISCONTINUED | OUTPATIENT
Start: 2022-09-11 | End: 2022-09-12 | Stop reason: HOSPADM

## 2022-09-10 RX ORDER — LOSARTAN POTASSIUM 50 MG/1
50 TABLET ORAL DAILY
Status: DISCONTINUED | OUTPATIENT
Start: 2022-09-11 | End: 2022-09-12 | Stop reason: HOSPADM

## 2022-09-10 RX ORDER — SODIUM CHLORIDE 9 MG/ML
INJECTION, SOLUTION INTRAVENOUS CONTINUOUS
Status: ACTIVE | OUTPATIENT
Start: 2022-09-11 | End: 2022-09-11

## 2022-09-10 RX ORDER — ACETAMINOPHEN 325 MG/1
650 TABLET ORAL EVERY 6 HOURS PRN
Status: DISCONTINUED | OUTPATIENT
Start: 2022-09-10 | End: 2022-09-12 | Stop reason: HOSPADM

## 2022-09-10 RX ADMIN — METHYLPREDNISOLONE SODIUM SUCCINATE 125 MG: 125 INJECTION, POWDER, FOR SOLUTION INTRAMUSCULAR; INTRAVENOUS at 15:26

## 2022-09-10 RX ADMIN — SODIUM CHLORIDE 500 ML: 9 INJECTION, SOLUTION INTRAVENOUS at 13:26

## 2022-09-10 RX ADMIN — IPRATROPIUM BROMIDE AND ALBUTEROL SULFATE 1 AMPULE: .5; 3 SOLUTION RESPIRATORY (INHALATION) at 13:25

## 2022-09-10 ASSESSMENT — PAIN SCALES - GENERAL
PAINLEVEL_OUTOF10: 0

## 2022-09-10 ASSESSMENT — ENCOUNTER SYMPTOMS
RHINORRHEA: 0
SHORTNESS OF BREATH: 1
DIARRHEA: 0
EYE REDNESS: 0
SORE THROAT: 1
VOMITING: 0
ABDOMINAL PAIN: 0
COUGH: 1

## 2022-09-10 ASSESSMENT — PAIN - FUNCTIONAL ASSESSMENT
PAIN_FUNCTIONAL_ASSESSMENT: 0-10

## 2022-09-10 ASSESSMENT — LIFESTYLE VARIABLES: HOW OFTEN DO YOU HAVE A DRINK CONTAINING ALCOHOL: NEVER

## 2022-09-10 NOTE — ED NOTES
Dr Odalys Brennan returned call and speaking with Dr Phuc De Leon- plans for admission     Marifer Flores RN  09/10/22 3963

## 2022-09-10 NOTE — ED NOTES
Chair to stand sits/standing oxygen saturation levels 87-89%- congested cough- nonproductive. Dr Agata Serra notified.  Patient states she does feel slightly better since Diandra Jonas, RN  09/10/22 7125

## 2022-09-10 NOTE — ED NOTES
Dr. Judie Alexandra for admission and calling [phone] to speak to [address], RN  09/10/22 2891

## 2022-09-10 NOTE — ED NOTES
Updated pt on admission plan of care/ awaiting transport eta per Access center. Pt on continuous pulse ox only no heart monitor per Md Max order.       Truman Ferguson RN  09/10/22 [address], RN  09/10/22 2396

## 2022-09-10 NOTE — ED NOTES
@ bedside w pt / brought in dinner due to wait time to get to [address] [place] [address] to await transport team w eta [phone].       Donna Curran RN  09/10/22 9519

## 2022-09-10 NOTE — ED NOTES
Access center called patient will be going to room 5280.  [address] 10:45 PM      Donita Cowart RN  09/10/22 9435

## 2022-09-10 NOTE — ED PROVIDER NOTES
[facility]  eMERGENCY dEPARTMENT eNCOUnter      Pt Name: Dulce Gifford  MRN: [id number]  [place] 1965  Date of evaluation: 9/10/2022  Provider: Val Norris MD    CHIEF COMPLAINT       Chief Complaint   Patient presents with    Fatigue     States covid symptoms for 11 days- states feels like body is too weak to carry head, covid + at MD office on 9/2/22    Nausea     For 11 days- feels possible dehydrated         HISTORY OF PRESENT ILLNESS   (Location/Symptom, Timing/Onset,Context/Setting, Quality, Duration, Modifying Factors, Severity)  Note limiting factors. Dulce Gifford is a 65 y.o. female who presents to the emergency department with cough, fatigue, malaise. The patient states on 31 August she started developing symptoms to include fever, cough, headache, sore throat, body aches. On 2 September she tested positive for COVID at her doctor's office. She comes in today because she continues to feel poorly. She states she does have a past medical history of COPD. She stopped smoking 4 years ago. Her cough is nonproductive. No hemoptysis. HPI    NursingNotes were reviewed. REVIEW OF SYSTEMS    (2-9 systems for level 4, 10 or more for level 5)     Review of Systems   Constitutional:  Positive for fever. HENT:  Positive for sore throat. Negative for rhinorrhea. Eyes:  Negative for redness. Respiratory:  Positive for cough and shortness of breath. Cardiovascular:  Negative for chest pain. Gastrointestinal:  Negative for abdominal pain, diarrhea and vomiting. Genitourinary:  Negative for flank pain. Musculoskeletal:  Positive for myalgias. Skin:  Negative for rash. Neurological:  Positive for headaches. Hematological:  Negative for adenopathy. Psychiatric/Behavioral:  Negative for confusion. Except as noted above the remainder of the review of systems was reviewed and negative.        PAST MEDICAL HISTORY     Past Medical History: Diagnosis Date    COPD (chronic obstructive pulmonary disease) (HCC)     COPD (chronic obstructive pulmonary disease) (HCC)     Diabetes mellitus (HCC)     Hypertension     GARCIA (nonalcoholic steatohepatitis)     Pancreatitis     Prediabetes     RA (rheumatoid arthritis) (HCC)     Rheumatoid arthritis (HCC)          SURGICALHISTORY       Past Surgical History:   Procedure Laterality Date     SECTION      CHOLECYSTECTOMY      GALLBLADDER SURGERY      HYSTERECTOMY (CERVIX STATUS UNKNOWN)      TUBAL LIGATION           CURRENT MEDICATIONS       Previous Medications    ADALIMUMAB 40 MG/0.4ML PNKT    Inject 40 mg into the skin    ALBUTEROL SULFATE  (90 BASE) MCG/ACT INHALER        DIAZEPAM (VALIUM) 5 MG TABLET    TAKE 1/2 TABLET BY MOUTH DAILY AS NEEDED    DICLOFENAC SODIUM (VOLTAREN) 1 % GEL    Apply 2 g topically 4 times daily    FLUTICASONE (FLONASE) 50 MCG/ACT NASAL SPRAY    SPRAY 2 SPRAY(S) EVERY DAY BY INTRANASAL ROUTE AS NEEDED.     GABAPENTIN (NEURONTIN) 100 MG CAPSULE    TAKE 1 CAPSULE BY MOUTH EVERYDAY AT BEDTIME    IBUPROFEN (ADVIL;MOTRIN) 600 MG TABLET    Take 1 tablet by mouth every 6 hours as needed for Pain    LOSARTAN (COZAAR) 50 MG TABLET    TAKE 1 TABLET BY MOUTH EVERY DAY    RITUXIMAB (RITUXAN IV)    Infuse intravenously    SPIRIVA RESPIMAT 2.5 MCG/ACT AERS INHALER    TAKE 2 PUFFS BY MOUTH EVERY DAY       ALLERGIES     Sulfasalazine, Azithromycin, Fluoxetine, Hydrocodone-acetaminophen, Penicillins, Serotonin reuptake inhibitors (ssris), Venlafaxine, Clindamycin/lincomycin, and Keflex [cephalexin]    FAMILY HISTORY       Family History   Problem Relation Age of Onset    High Blood Pressure Mother     Other Mother         thyroid    Heart Disease Mother           SOCIAL HISTORY       Social History     Socioeconomic History    Marital status:      Spouse name: None    Number of children: None    Years of education: None    Highest education level: None   Tobacco Use    Smoking status: Former     Packs/day: 0.50     Years: 30.00     Pack years: 15.00     Types: Cigarettes    Smokeless tobacco: Never   Vaping Use    Vaping Use: Never used   Substance and Sexual Activity    Alcohol use: No     Alcohol/week: 0.0 standard drinks    Drug use: No    Sexual activity: Not Currently       SCREENINGS             PHYSICAL EXAM    (up to 7 for level 4, 8 or more for level 5)     ED Triage Vitals [09/10/22 1222]   BP Temp Temp Source Heart Rate Resp SpO2 Height Weight   112/73 100.2 °F (37.9 °C) Tympanic 82 18 90 % 5' (1.524 m) 222 lb 7.1 oz (100.9 kg)       Physical Exam  Vitals and nursing note reviewed. Constitutional:       Appearance: She is well-developed. She is not diaphoretic. HENT:      Head: Normocephalic and atraumatic. Mouth/Throat:      Mouth: Mucous membranes are moist.   Eyes:      General:         Right eye: No discharge. Left eye: No discharge. Conjunctiva/sclera: Conjunctivae normal.   Cardiovascular:      Rate and Rhythm: Normal rate. Heart sounds: No murmur heard. Pulmonary:      Effort: Pulmonary effort is normal. No respiratory distress. Comments: The patient has decreased breath sounds throughout but I do not hear any viviane wheezing. No rales or rhonchi appreciated  Abdominal:      Palpations: Abdomen is soft. Tenderness: There is no abdominal tenderness. Musculoskeletal:         General: Normal range of motion. Cervical back: Neck supple. Skin:     General: Skin is warm and dry. Capillary Refill: Capillary refill takes less than 2 seconds. Neurological:      Mental Status: She is alert and oriented to person, place, and time.    Psychiatric:         Behavior: Behavior normal.       DIAGNOSTIC RESULTS     EKG: All EKG's are interpreted by the Emergency Department Physician who either signs or Co-signsthis chart in the absence of a cardiologist.        RADIOLOGY:   Non-plain filmimages such as CT, Ultrasound and MRI are read by the radiologist. Plain radiographic images are visualized and preliminarily interpreted by the emergency physician with the below findings:        Interpretation per the Radiologist below, if available at the time ofthis note:    XR CHEST PORTABLE   Final Result   Possible left peripheral basilar airspace disease. Upright PA and lateral   chest radiographs could be performed for confirmation if indicated. ED BEDSIDE ULTRASOUND:   Performed by ED Physician - none    LABS:  Labs Reviewed   CBC WITH AUTO DIFFERENTIAL - Abnormal; Notable for the following components:       Result Value    WBC 2.8 (*)     Platelets 098 (*)     MPV 11.1 (*)     Neutrophils Absolute 1.1 (*)     All other components within normal limits   COMPREHENSIVE METABOLIC PANEL - Abnormal; Notable for the following components:    Glucose 115 (*)     ALT 73 (*)      (*)     All other components within normal limits   CULTURE, BLOOD 1   CULTURE, BLOOD 2   LACTIC ACID   TROPONIN   PROCALCITONIN   LACTIC ACID       All other labs were within normal range or not returned as of this dictation. EMERGENCY DEPARTMENT COURSE and DIFFERENTIAL DIAGNOSIS/MDM:   Vitals:    Vitals:    09/10/22 1222 09/10/22 1330 09/10/22 1515   BP: 112/73 108/62 101/64   Pulse: 82 84 82   Resp: 18     Temp: 100.2 °F (37.9 °C) 99.9 °F (37.7 °C) (!) 100.5 °F (38.1 °C)   TempSrc: Tympanic  Tympanic   SpO2: 90% 91% 93%   Weight: 222 lb 7.1 oz (100.9 kg)     Height: 5' (1.524 m)             MDM  Number of Diagnoses or Management Options  Abnormal LFTs  COVID  History of COPD  Hypoxia  Leukopenia, unspecified type  Diagnosis management comments: DDx: COVID, pneumonia, pleural effusion, COPD exacerbation, other    Work-up of the patient is significant for a little leukopenia, lactic acid less than 2, ALT of 73, AST of 183. Platelets are also slightly decreased. The chest x-ray is suggestive of a possible left peripheral basilar airspace disease.   At this time I feel the patient has COVID pneumonia with some hypoxia. She was given a breathing treatment and while she clinically feels better there is been no change in her pulse ox. With stand sets in her room the patient is hypoxic. As such she will need admission for this. After discussion with the hospitalist we will start the patient on Solu-Medrol. Is this patient to be included in the SEP-1 Core Measure? No   Exclusion criteria - the patient is NOT to be included for SEP-1 Core Measure due to:  Viral etiology found or highly suspected (including COVID-19) without concomitant bacterial infection     CRITICAL CARE TIME   I personally saw the patient and independently provided 30 minutes of non-concurrent critical care out of the total shared critical care time provided. There was a high probability of clinically significant/life threatening deterioration in the patient's condition which required my urgent intervention. CONSULTS:  None    PROCEDURES:  Unless otherwise noted below, none     Procedures    FINAL IMPRESSION      1. COVID    2. Hypoxia    3. History of COPD    4. Leukopenia, unspecified type    5. Abnormal LFTs          DISPOSITION/PLAN   DISPOSITION Admitted 09/10/2022 03:08:16 PM      PATIENT REFERRED TO:  No follow-up provider specified.     DISCHARGE MEDICATIONS:  New Prescriptions    No medications on file          (Please note that portions of this note were completed with a voice recognition program.Efforts were made to edit the dictations but occasionally words are mis-transcribed.)    Jamilah Menchaca MD (electronically signed)  Attending Emergency Physician          Jamilah Menchaca MD  09/10/22 8879

## 2022-09-10 NOTE — ED NOTES
Pt sitting up @ bedside eating snacks. Tolerating well. Ambulated to bathroom to void.       Farida Rodriguez RN  09/10/22 1559

## 2022-09-11 LAB
A/G RATIO: 1 (ref 1.1–2.2)
ALBUMIN SERPL-MCNC: 3 G/DL (ref 3.4–5)
ALP BLD-CCNC: 89 U/L (ref 40–129)
ALT SERPL-CCNC: 49 U/L (ref 10–40)
ANION GAP SERPL CALCULATED.3IONS-SCNC: 11 MMOL/L (ref 3–16)
AST SERPL-CCNC: 88 U/L (ref 15–37)
BILIRUB SERPL-MCNC: 0.4 MG/DL (ref 0–1)
BUN BLDV-MCNC: 9 MG/DL (ref 7–20)
C-REACTIVE PROTEIN: 9.7 MG/L (ref 0–5.1)
CALCIUM SERPL-MCNC: 8.6 MG/DL (ref 8.3–10.6)
CHLORIDE BLD-SCNC: 106 MMOL/L (ref 99–110)
CO2: 20 MMOL/L (ref 21–32)
CREAT SERPL-MCNC: 0.6 MG/DL (ref 0.6–1.1)
GFR AFRICAN AMERICAN: >60
GFR NON-AFRICAN AMERICAN: >60
GLUCOSE BLD-MCNC: 235 MG/DL (ref 70–99)
MAGNESIUM: 2.1 MG/DL (ref 1.8–2.4)
POTASSIUM SERPL-SCNC: 4 MMOL/L (ref 3.5–5.1)
PROCALCITONIN: 0.12 NG/ML (ref 0–0.15)
SODIUM BLD-SCNC: 137 MMOL/L (ref 136–145)
TOTAL PROTEIN: 6 G/DL (ref 6.4–8.2)

## 2022-09-11 PROCEDURE — 96361 HYDRATE IV INFUSION ADD-ON: CPT

## 2022-09-11 PROCEDURE — 85025 COMPLETE CBC W/AUTO DIFF WBC: CPT

## 2022-09-11 PROCEDURE — 94669 MECHANICAL CHEST WALL OSCILL: CPT

## 2022-09-11 PROCEDURE — 36415 COLL VENOUS BLD VENIPUNCTURE: CPT

## 2022-09-11 PROCEDURE — 83735 ASSAY OF MAGNESIUM: CPT

## 2022-09-11 PROCEDURE — 2580000003 HC RX 258: Performed by: STUDENT IN AN ORGANIZED HEALTH CARE EDUCATION/TRAINING PROGRAM

## 2022-09-11 PROCEDURE — 94760 N-INVAS EAR/PLS OXIMETRY 1: CPT

## 2022-09-11 PROCEDURE — 6370000000 HC RX 637 (ALT 250 FOR IP): Performed by: FAMILY MEDICINE

## 2022-09-11 PROCEDURE — G0378 HOSPITAL OBSERVATION PER HR: HCPCS

## 2022-09-11 PROCEDURE — 96365 THER/PROPH/DIAG IV INF INIT: CPT

## 2022-09-11 PROCEDURE — 96372 THER/PROPH/DIAG INJ SC/IM: CPT

## 2022-09-11 PROCEDURE — 80053 COMPREHEN METABOLIC PANEL: CPT

## 2022-09-11 PROCEDURE — 6360000002 HC RX W HCPCS: Performed by: FAMILY MEDICINE

## 2022-09-11 PROCEDURE — 84145 PROCALCITONIN (PCT): CPT

## 2022-09-11 PROCEDURE — 2580000003 HC RX 258: Performed by: FAMILY MEDICINE

## 2022-09-11 PROCEDURE — 1200000000 HC SEMI PRIVATE

## 2022-09-11 PROCEDURE — 86140 C-REACTIVE PROTEIN: CPT

## 2022-09-11 RX ORDER — PANTOPRAZOLE SODIUM 40 MG/1
40 TABLET, DELAYED RELEASE ORAL
COMMUNITY

## 2022-09-11 RX ORDER — GUAIFENESIN 600 MG/1
600 TABLET, EXTENDED RELEASE ORAL 2 TIMES DAILY
Status: DISCONTINUED | OUTPATIENT
Start: 2022-09-11 | End: 2022-09-12 | Stop reason: HOSPADM

## 2022-09-11 RX ORDER — ENOXAPARIN SODIUM 100 MG/ML
30 INJECTION SUBCUTANEOUS 2 TIMES DAILY
Status: DISCONTINUED | OUTPATIENT
Start: 2022-09-12 | End: 2022-09-12 | Stop reason: HOSPADM

## 2022-09-11 RX ORDER — ALBUTEROL SULFATE 90 UG/1
2 AEROSOL, METERED RESPIRATORY (INHALATION) 4 TIMES DAILY
Status: DISCONTINUED | OUTPATIENT
Start: 2022-09-11 | End: 2022-09-11

## 2022-09-11 RX ORDER — ALBUTEROL SULFATE 90 UG/1
2 AEROSOL, METERED RESPIRATORY (INHALATION) EVERY 4 HOURS PRN
Status: DISCONTINUED | OUTPATIENT
Start: 2022-09-11 | End: 2022-09-12 | Stop reason: HOSPADM

## 2022-09-11 RX ADMIN — LOSARTAN POTASSIUM 50 MG: 50 TABLET, FILM COATED ORAL at 08:28

## 2022-09-11 RX ADMIN — ENOXAPARIN SODIUM 40 MG: 100 INJECTION SUBCUTANEOUS at 08:30

## 2022-09-11 RX ADMIN — GUAIFENESIN 600 MG: 600 TABLET ORAL at 12:07

## 2022-09-11 RX ADMIN — CEFTRIAXONE 2000 MG: 2 INJECTION, POWDER, FOR SOLUTION INTRAMUSCULAR; INTRAVENOUS at 00:59

## 2022-09-11 RX ADMIN — GABAPENTIN 100 MG: 100 CAPSULE ORAL at 00:59

## 2022-09-11 RX ADMIN — DEXAMETHASONE 6 MG: 6 TABLET ORAL at 08:27

## 2022-09-11 RX ADMIN — SODIUM CHLORIDE, PRESERVATIVE FREE 10 ML: 5 INJECTION INTRAVENOUS at 19:54

## 2022-09-11 RX ADMIN — Medication 2 PUFF: at 09:23

## 2022-09-11 RX ADMIN — Medication 2 PUFF: at 09:24

## 2022-09-11 RX ADMIN — GABAPENTIN 100 MG: 100 CAPSULE ORAL at 19:54

## 2022-09-11 RX ADMIN — GUAIFENESIN 600 MG: 600 TABLET ORAL at 19:54

## 2022-09-11 RX ADMIN — SODIUM CHLORIDE: 9 INJECTION, SOLUTION INTRAVENOUS at 00:58

## 2022-09-11 RX ADMIN — SODIUM CHLORIDE 500 ML: 9 INJECTION, SOLUTION INTRAVENOUS at 00:57

## 2022-09-11 ASSESSMENT — PAIN SCALES - GENERAL: PAINLEVEL_OUTOF10: 0

## 2022-09-11 NOTE — H&P
Hospital Medicine History & Physical      PCP: JEB Driver CNP    Date of Admission: 9/10/2022    Date of Service: 2022    Chief Complaint:  weakness and fatigue      History Of Present Illness: The patient is a 65 y.o. female with rheumatoid arthritis on rituximab who presents to [facility] ED with weakness and productive cough. Symptoms started over a week ago and she went to her PCP and tested positive for Covid. No fevers, chills, but fatigue and productive cough has progressed. In the ED she was noted to have a potential bacterial pneumonia, but labs and vital signs were otherwise unremarkable. Medicine called to admit as a transfer to [facility].    Past Medical History:        Diagnosis Date    COPD (chronic obstructive pulmonary disease) (HCC)     COPD (chronic obstructive pulmonary disease) (HCC)     Diabetes mellitus (HCC)     Hypertension     GARCIA (nonalcoholic steatohepatitis)     Pancreatitis     Prediabetes     RA [facility]rheumatoid arthritis) ([facility]     Rheumatoid arthritis ([facility]        Past Surgical History:        Procedure Laterality Date     SECTION      CHOLECYSTECTOMY      GALLBLADDER SURGERY      HYSTERECTOMY (CERVIX STATUS UNKNOWN)      TUBAL LIGATION         Medications Prior to Admission:    Prior to Admission medications    Medication Sig Start Date End Date Taking?  Authorizing Provider   losartan (COZAAR) 50 MG tablet TAKE 1 TABLET BY MOUTH EVERY DAY 21   Historical Provider, MD   diazePAM (VALIUM) 5 MG tablet TAKE 1/2 TABLET BY MOUTH DAILY AS NEEDED 20   Historical Provider, MD   gabapentin (NEURONTIN) 100 MG capsule TAKE 1 CAPSULE BY MOUTH EVERYDAY AT BEDTIME 21   Historical Provider, MD   diclofenac sodium (VOLTAREN) 1 % GEL Apply 2 g topically 4 times daily  Patient not taking: Reported on 9/10/2022 10/14/20   Historical Provider, MD   riTUXimab (RITUXAN IV) Infuse intravenously    Historical Provider, MD   Adalimumab 40 MG/0.4ML PNKT Inject 40 mg into the skin  Patient not taking: Reported on 9/10/2022 4/23/19   Historical Provider, MD   albuterol sulfate  (90 Base) MCG/ACT inhaler  5/20/18   Historical Provider, MD   fluticasone (FLONASE) 50 MCG/ACT nasal spray SPRAY 2 SPRAY(S) EVERY DAY BY INTRANASAL ROUTE AS NEEDED. 10/23/18   Historical Provider, MD   SPIRIVA RESPIMAT 2.5 MCG/ACT AERS inhaler TAKE 2 PUFFS BY MOUTH EVERY DAY 5/31/19   Historical Provider, MD   ibuprofen (ADVIL;MOTRIN) 600 MG tablet Take 1 tablet by mouth every 6 hours as needed for Pain 4/15/17   Alexx Pickens MD       Allergies:  Sulfasalazine, Azithromycin, Fluoxetine, Hydrocodone-acetaminophen, Penicillins, Serotonin reuptake inhibitors (ssris), Venlafaxine, Clindamycin/lincomycin, and Keflex [cephalexin]    Social History:  The patient currently lives at home    TOBACCO:   reports that she has quit smoking. Her smoking use included cigarettes. She has a 15.00 pack-year smoking history. She has never used smokeless tobacco.  ETOH:   reports no history of alcohol use. Family History:  Reviewed in detail and negative for DM, Early CAD, Cancer, CVA. Positive as follows:        Problem Relation Age of Onset    High Blood Pressure Mother     Other Mother         thyroid    Heart Disease Mother        REVIEW OF SYSTEMS:   Positive for weakness/fatigue and as noted in the HPI. All other systems reviewed and negative. PHYSICAL EXAM:    /69   Pulse 64   Temp 97.6 °F (36.4 °C) (Oral)   Resp 17   Ht 5' (1.524 m)   Wt 225 lb 1.4 oz (102.1 kg)   LMP  (LMP Unknown)   SpO2 92%   BMI 43.96 kg/m²     General appearance: No apparent distress appears stated age and cooperative. HEENT Normal cephalic, atraumatic without obvious deformity. Pupils equal, round, and reactive to light. Extra ocular muscles intact. Conjunctivae/corneas clear. Neck: Supple, No jugular venous distention/bruits. Trachea midline without thyromegaly or adenopathy with full range of motion. Lungs: Clear to auscultation, bilaterally without Rales/Wheezes/Rhonchi with good respiratory effort. Heart: Regular rate and rhythm with Normal S1/S2 without murmurs, rubs or gallops, point of maximum impulse non-displaced  Abdomen: Soft, non-tender or non-distended without rigidity or guarding and positive bowel sounds all four quadrants. Extremities: No clubbing, cyanosis, or edema bilaterally. Full range of motion without deformity and normal gait intact. Skin: Skin color, texture, turgor normal.  No rashes or lesions. Neurologic: Alert and oriented X 3, neurovascularly intact with sensory/motor intact upper extremities/lower extremities, bilaterally. Cranial nerves: II-XII intact, grossly non-focal.  Mental status: Alert, oriented, thought content appropriate. Capillary Refill: Acceptable  < 3 seconds  Peripheral Pulses: +3 Easily felt, not easily obliterated with pressure      CBC   Recent Labs     09/10/22  1310   WBC 2.8*   HGB 14.0   HCT 43.4   *      RENAL  Recent Labs     09/10/22  1310      K 3.8      CO2 26   BUN 9   CREATININE 0.8     LFT'S  Recent Labs     09/10/22  1310   *   ALT 73*   BILITOT 0.7   ALKPHOS 95     COAG  No results for input(s): INR in the last 72 hours.   CARDIAC ENZYMES  Recent Labs     09/10/22  1310   TROPONINI <0.01       U/A:    Lab Results   Component Value Date/Time    COLORU Yellow 04/01/2022 07:45 PM    WBCUA None seen 02/27/2021 06:49 PM    RBCUA 0-2 02/27/2021 06:49 PM    MUCUS 1+ 12/27/2009 10:20 AM    BACTERIA Rare 02/27/2021 06:49 PM    CLARITYU Clear 04/01/2022 07:45 PM    SPECGRAV 1.025 04/01/2022 07:45 PM    LEUKOCYTESUR Negative 04/01/2022 07:45 PM    BLOODU Negative 04/01/2022 07:45 PM    GLUCOSEU Negative 04/01/2022 07:45 PM    GLUCOSEU Negative 12/27/2009 10:20 AM       ABG No results found for: YAI1FWG, BEART, J5OJUIVY, PHART, THGBART, ELS8KDS, PO2ART, TWW4OJW        Active Hospital Problems    Diagnosis Date Noted    COVID [U07.1] 09/10/2022     Priority: Medium    Pneumonia [J18.9] 09/10/2022     Priority: Medium    [facility] [J44.9]      Priority: Medium    Hypertension [I10]      Priority: Medium    RA (rheumatoid arthritis) ([facility] [M06.9]      Priority: Medium         PHYSICIANS CERTIFICATION:    I certify that Ava Arora is expected to be hospitalized for more than 2 midnights based on the following assessment and plan:      ASSESSMENT/PLAN:    Covid  Potential pneumonia  Immunosuppressed  COPD -- doesn't appear to be in exacerbation  - start ceftriaxone + azith  - Decadron  - combivent  - mucinex, flutter valve    Rheumatoid Arthritis  - on rituximab outpatient, last dose several months ago  - no further agents while inpatient    DVT Prophylaxis: lovenox  Diet: ADULT DIET; Regular  Code Status: Full Code  PT/OT Eval Status: n/a    [place] - Magdi Marie MD    Thank you JEB Agrawal - SUNG for the opportunity to be involved in this patient's care. If you have any questions or concerns please feel free to contact me at [phone].

## 2022-09-11 NOTE — ED NOTES
Report given to [address] [place] [address] for transport      Danilo AlmendarezDepartment of [facility]  09/10/22 4720

## 2022-09-11 NOTE — PROGRESS NOTES
Admitted patient to room [phone] [id number] from the [facility] Emergency Room with shortness of breathe. VS recorded (see flowsheet). Patient's breathing regular and unlabored, denies pain. Oriented to room, call light, TV, phone, patient rights and responsibilities. Bed in lowest position and locked, exit alarm in place. Non-slip socks on. ID bracelet on and correct per pt verbally reporting name and date of birth. Call light within reach. Needed items within reach.  Electronically signed by Eleonora Esparza RN on 9/11/2022 at 12:02 AM

## 2022-09-11 NOTE — PLAN OF CARE
Problem: Discharge Planning  Goal: Discharge to home or other facility with appropriate resources  Outcome: Progressing  Flowsheets (Taken 9/11/2022 2765)  Discharge to home or other facility with appropriate resources: Identify barriers to discharge with patient and caregiver     Problem: Pain  Goal: Verbalizes/displays adequate comfort level or baseline comfort level  Outcome: Progressing     Problem: Safety - Adult  Goal: Free from fall injury  Outcome: Progressing     Problem: ABCDS Injury Assessment  Goal: Absence of physical injury  Outcome: Progressing

## 2022-09-11 NOTE — PROGRESS NOTES
Patient refused to take oral Azithromycin. Patient states it has given her C-Diff in the past and will not take it.  Electronically signed by Teo Brooks RN on 9/11/2022 at 1:06 AM

## 2022-09-11 NOTE — RT PROTOCOL NOTE
RT Inhaler-Nebulizer Bronchodilator Protocol Note    There is a bronchodilator order in the chart from a provider indicating to follow the RT Bronchodilator Protocol and there is an Initiate RT Inhaler-Nebulizer Bronchodilator Protocol order as well (see protocol at bottom of note). CXR Findings:  XR CHEST PORTABLE    Result Date: 9/10/2022  Possible left peripheral basilar airspace disease. Upright PA and lateral chest radiographs could be performed for confirmation if indicated. The findings from the last RT Protocol Assessment were as follows:   History Pulmonary Disease: Smoker 15 pack years or more  Respiratory Pattern: Regular pattern and RR 12-20 bpm  Breath Sounds: Clear breath sounds  Cough: Strong, spontaneous, non-productive  Indication for Bronchodilator Therapy: Decreased or absent breath sounds  Bronchodilator Assessment Score: 1    Aerosolized bronchodilator medication orders have been revised according to the RT Inhaler-Nebulizer Bronchodilator Protocol below. Respiratory Therapist to perform RT Therapy Protocol Assessment initially then follow the protocol. Repeat RT Therapy Protocol Assessment PRN for score 0-3 or on second treatment, BID, and PRN for scores above 3. No Indications - adjust the frequency to every 6 hours PRN wheezing or bronchospasm, if no treatments needed after 48 hours then discontinue using Per Protocol order mode. If indication present, adjust the RT bronchodilator orders based on the Bronchodilator Assessment Score as indicated below. Use Inhaler orders unless patient has one or more of the following: on home nebulizer, not able to hold breath for 10 seconds, is not alert and oriented, cannot activate and use MDI correctly, or respiratory rate 25 breaths per minute or more, then use the equivalent nebulizer order(s) with same Frequency and PRN reasons based on the score.   If a patient is on this medication at home then do not decrease Frequency below that used at home. 0-3 - enter or revise RT bronchodilator order(s) to equivalent RT Bronchodilator order with Frequency of every 4 hours PRN for wheezing or increased work of breathing using Per Protocol order mode. 4-6 - enter or revise RT Bronchodilator order(s) to two equivalent RT bronchodilator orders with one order with BID Frequency and one order with Frequency of every 4 hours PRN wheezing or increased work of breathing using Per Protocol order mode. 7-10 - enter or revise RT Bronchodilator order(s) to two equivalent RT bronchodilator orders with one order with TID Frequency and one order with Frequency of every 4 hours PRN wheezing or increased work of breathing using Per Protocol order mode. 11-13 - enter or revise RT Bronchodilator order(s) to one equivalent RT bronchodilator order with QID Frequency and an Albuterol order with Frequency of every 4 hours PRN wheezing or increased work of breathing using Per Protocol order mode. Greater than 13 - enter or revise RT Bronchodilator order(s) to one equivalent RT bronchodilator order with every 4 hours Frequency and an Albuterol order with Frequency of every 2 hours PRN wheezing or increased work of breathing using Per Protocol order mode. RT to enter RT Home Evaluation for COPD & MDI Assessment order using Per Protocol order mode.     Electronically signed by Magaly Mathis RCP on 9/11/2022 at 9:26 AM

## 2022-09-11 NOTE — PROGRESS NOTES
Clinical Pharmacy Note  Subcutaneous Anticoagulant Adjustment     Enoxaparin has been adjusted to 30 mg subQ twice daily based on [facility] policy. Recent Labs     09/10/22  1310   CREATININE 0.8     Recent Labs     09/10/22  1310   HGB 14.0   HCT 43.4   *     Estimated Creatinine Clearance: 84 mL/min (based on SCr of 0.8 mg/dL). Pharmacist Review of Appropriate Use and Automatic Dose Adjustment of Subcutaneous Anticoagulants (Adult)    The guidance below is to provide initial recommendations for dosing. If recommended dose does not align well with patient's current clinical picture, communications with the care team will occur to determine most appropriate medication and dose. TABLE 1.   ENOXAPARIN ROUTINE PROPHYLAXIS DOSING (Medically ill, routine surgery)   Patient Weight (kg)     50.9 and below 51 - 100.9 101 - 150.9 151 - 174.9 175 or greater         Estimated CrCl  (ml/min) 30 or greater   30 mg SUBQ daily   40 mg SUBQ daily 30 mg SUBQ BID  40 mg SUBQ BID 60mg SUBQ BID      15-29 UFH 5000 units SUBQ BID   30 mg SUBQ daily 30 mg SUBQ daily 40 mg SUBQ daily   60 mg SUBQ daily      Less than 15 or Dialysis UFH 5000 units SUBQ BID   UFH 5000 units SUBQ TID UFH 7500 units SUBQ TID       ILENE Franco [facility] 9/11/2022 8:46 AM

## 2022-09-11 NOTE — PROGRESS NOTES
Pharmacy Medication Reconciliation Note     List of medications patient is currently taking is complete. Source of information:   1. Conversation with patient  2. EMR    Notes regarding home medications: Added pantoprazole to home med list  2. Patient only using her Spiriva inhaler as needed at home  3.    Removed ibuprofen, adalimumab injections, and diclofenac from list      [id number] [facility] [place]  9/11/2022 1:16 PM

## 2022-09-11 NOTE — PLAN OF CARE
Problem: Discharge Planning  Goal: Discharge to home or other facility with appropriate resources  9/11/2022 1036 by Vasquez Powers RN  Outcome: Progressing  9/11/2022 0637 by Donavan Webb RN  Outcome: Progressing  Flowsheets (Taken 9/11/2022 0133)  Discharge to home or other facility with appropriate resources: Identify barriers to discharge with patient and caregiver     Problem: Pain  Goal: Verbalizes/displays adequate comfort level or baseline comfort level  9/11/2022 1036 by Vasquez Powers RN  Outcome: Progressing  9/11/2022 0637 by Donavan Webb RN  Outcome: Progressing     Problem: Safety - Adult  Goal: Free from fall injury  9/11/2022 1036 by Vasquez Powers RN  Outcome: Progressing  9/11/2022 0637 by Donavan Webb RN  Outcome: Progressing     Problem: ABCDS Injury Assessment  Goal: Absence of physical injury  9/11/2022 1036 by Vasquez Powers RN  Outcome: Progressing  9/11/2022 0637 by Donavan Webb RN  Outcome: Progressing

## 2022-09-12 VITALS
RESPIRATION RATE: 16 BRPM | OXYGEN SATURATION: 92 % | TEMPERATURE: 99.2 F | HEART RATE: 79 BPM | HEIGHT: 60 IN | WEIGHT: 227.96 LBS | BODY MASS INDEX: 44.75 KG/M2 | DIASTOLIC BLOOD PRESSURE: 78 MMHG | SYSTOLIC BLOOD PRESSURE: 133 MMHG

## 2022-09-12 LAB
A/G RATIO: 1.1 (ref 1.1–2.2)
ALBUMIN SERPL-MCNC: 3.2 G/DL (ref 3.4–5)
ALP BLD-CCNC: 86 U/L (ref 40–129)
ALT SERPL-CCNC: 45 U/L (ref 10–40)
ANION GAP SERPL CALCULATED.3IONS-SCNC: 15 MMOL/L (ref 3–16)
AST SERPL-CCNC: 62 U/L (ref 15–37)
BANDED NEUTROPHILS RELATIVE PERCENT: 10 % (ref 0–7)
BASOPHILS ABSOLUTE: 0 K/UL (ref 0–0.2)
BASOPHILS ABSOLUTE: 0.1 K/UL (ref 0–0.2)
BASOPHILS RELATIVE PERCENT: 0 %
BASOPHILS RELATIVE PERCENT: 0.8 %
BILIRUB SERPL-MCNC: 0.4 MG/DL (ref 0–1)
BUN BLDV-MCNC: 12 MG/DL (ref 7–20)
CALCIUM SERPL-MCNC: 8.5 MG/DL (ref 8.3–10.6)
CHLORIDE BLD-SCNC: 108 MMOL/L (ref 99–110)
CO2: 18 MMOL/L (ref 21–32)
CREAT SERPL-MCNC: 0.6 MG/DL (ref 0.6–1.1)
EOSINOPHILS ABSOLUTE: 0 K/UL (ref 0–0.6)
EOSINOPHILS ABSOLUTE: 0 K/UL (ref 0–0.6)
EOSINOPHILS RELATIVE PERCENT: 0 %
EOSINOPHILS RELATIVE PERCENT: 0.1 %
GFR AFRICAN AMERICAN: >60
GFR NON-AFRICAN AMERICAN: >60
GLUCOSE BLD-MCNC: 175 MG/DL (ref 70–99)
HCT VFR BLD CALC: 39.8 % (ref 36–48)
HCT VFR BLD CALC: 40.1 % (ref 36–48)
HEMATOLOGY PATH CONSULT: NORMAL
HEMATOLOGY PATH CONSULT: YES
HEMOGLOBIN: 13.4 G/DL (ref 12–16)
HEMOGLOBIN: 13.6 G/DL (ref 12–16)
LYMPHOCYTES ABSOLUTE: 0.3 K/UL (ref 1–5.1)
LYMPHOCYTES ABSOLUTE: 0.3 K/UL (ref 1–5.1)
LYMPHOCYTES RELATIVE PERCENT: 22 %
LYMPHOCYTES RELATIVE PERCENT: 4 %
MAGNESIUM: 2.2 MG/DL (ref 1.8–2.4)
MCH RBC QN AUTO: 30.6 PG (ref 26–34)
MCH RBC QN AUTO: 31.1 PG (ref 26–34)
MCHC RBC AUTO-ENTMCNC: 33.8 G/DL (ref 31–36)
MCHC RBC AUTO-ENTMCNC: 33.8 G/DL (ref 31–36)
MCV RBC AUTO: 90.5 FL (ref 80–100)
MCV RBC AUTO: 91.8 FL (ref 80–100)
MONOCYTES ABSOLUTE: 0 K/UL (ref 0–1.3)
MONOCYTES ABSOLUTE: 0.5 K/UL (ref 0–1.3)
MONOCYTES RELATIVE PERCENT: 3 %
MONOCYTES RELATIVE PERCENT: 6 %
NEUTROPHILS ABSOLUTE: 1.1 K/UL (ref 1.7–7.7)
NEUTROPHILS ABSOLUTE: 6.8 K/UL (ref 1.7–7.7)
NEUTROPHILS RELATIVE PERCENT: 65 %
NEUTROPHILS RELATIVE PERCENT: 89.1 %
PDW BLD-RTO: 13.6 % (ref 12.4–15.4)
PDW BLD-RTO: 13.8 % (ref 12.4–15.4)
PLATELET # BLD: 105 K/UL (ref 135–450)
PLATELET # BLD: 94 K/UL (ref 135–450)
PLATELET SLIDE REVIEW: ABNORMAL
PLATELET SLIDE REVIEW: ABNORMAL
PMV BLD AUTO: 9.4 FL (ref 5–10.5)
PMV BLD AUTO: 9.8 FL (ref 5–10.5)
POTASSIUM SERPL-SCNC: 4.4 MMOL/L (ref 3.5–5.1)
RBC # BLD: 4.37 M/UL (ref 4–5.2)
RBC # BLD: 4.4 M/UL (ref 4–5.2)
SLIDE REVIEW: ABNORMAL
SLIDE REVIEW: ABNORMAL
SODIUM BLD-SCNC: 141 MMOL/L (ref 136–145)
TOTAL PROTEIN: 6.1 G/DL (ref 6.4–8.2)
WBC # BLD: 1.4 K/UL (ref 4–11)
WBC # BLD: 7.7 K/UL (ref 4–11)

## 2022-09-12 PROCEDURE — 94761 N-INVAS EAR/PLS OXIMETRY MLT: CPT

## 2022-09-12 PROCEDURE — 36415 COLL VENOUS BLD VENIPUNCTURE: CPT

## 2022-09-12 PROCEDURE — 2580000003 HC RX 258: Performed by: FAMILY MEDICINE

## 2022-09-12 PROCEDURE — 83735 ASSAY OF MAGNESIUM: CPT

## 2022-09-12 PROCEDURE — 6370000000 HC RX 637 (ALT 250 FOR IP): Performed by: FAMILY MEDICINE

## 2022-09-12 PROCEDURE — 94669 MECHANICAL CHEST WALL OSCILL: CPT

## 2022-09-12 PROCEDURE — 2700000000 HC OXYGEN THERAPY PER DAY

## 2022-09-12 PROCEDURE — 6360000002 HC RX W HCPCS: Performed by: FAMILY MEDICINE

## 2022-09-12 PROCEDURE — 80053 COMPREHEN METABOLIC PANEL: CPT

## 2022-09-12 PROCEDURE — 85025 COMPLETE CBC W/AUTO DIFF WBC: CPT

## 2022-09-12 RX ORDER — GUAIFENESIN 600 MG/1
600 TABLET, EXTENDED RELEASE ORAL 2 TIMES DAILY
Qty: 20 TABLET | Refills: 1 | Status: SHIPPED | OUTPATIENT
Start: 2022-09-12

## 2022-09-12 RX ORDER — DEXAMETHASONE 6 MG/1
6 TABLET ORAL DAILY
Qty: 7 TABLET | Refills: 0 | Status: SHIPPED | OUTPATIENT
Start: 2022-09-13 | End: 2022-09-23

## 2022-09-12 RX ORDER — ALBUTEROL SULFATE 90 UG/1
2 AEROSOL, METERED RESPIRATORY (INHALATION) EVERY 4 HOURS PRN
Qty: 18 G | Refills: 3 | Status: SHIPPED | OUTPATIENT
Start: 2022-09-12

## 2022-09-12 RX ORDER — DIAZEPAM 2 MG/1
2 TABLET ORAL DAILY PRN
Qty: 4 TABLET | Refills: 0 | Status: SHIPPED | OUTPATIENT
Start: 2022-09-12 | End: 2022-09-12 | Stop reason: SDUPTHER

## 2022-09-12 RX ORDER — DIAZEPAM 2 MG/1
2 TABLET ORAL DAILY PRN
Qty: 4 TABLET | Refills: 0 | Status: SHIPPED | OUTPATIENT
Start: 2022-09-12 | End: 2022-09-16

## 2022-09-12 RX ORDER — DEXAMETHASONE 6 MG/1
6 TABLET ORAL DAILY
Qty: 10 TABLET | Refills: 0 | Status: SHIPPED | OUTPATIENT
Start: 2022-09-13 | End: 2022-09-12 | Stop reason: SDUPTHER

## 2022-09-12 RX ADMIN — ENOXAPARIN SODIUM 30 MG: 100 INJECTION SUBCUTANEOUS at 08:40

## 2022-09-12 RX ADMIN — LOSARTAN POTASSIUM 50 MG: 50 TABLET, FILM COATED ORAL at 08:40

## 2022-09-12 RX ADMIN — CEFTRIAXONE 2000 MG: 2 INJECTION, POWDER, FOR SOLUTION INTRAMUSCULAR; INTRAVENOUS at 00:15

## 2022-09-12 RX ADMIN — DEXAMETHASONE 6 MG: 6 TABLET ORAL at 08:40

## 2022-09-12 RX ADMIN — GUAIFENESIN 600 MG: 600 TABLET ORAL at 08:40

## 2022-09-12 RX ADMIN — AZITHROMYCIN 250 MG: 250 TABLET, FILM COATED ORAL at 08:40

## 2022-09-12 ASSESSMENT — PAIN SCALES - GENERAL
PAINLEVEL_OUTOF10: 0

## 2022-09-12 NOTE — PROGRESS NOTES
Pt discharged home. Transportation provided by . IV removed without complications. Instructions explained and all questions answered at this time.

## 2022-09-12 NOTE — CARE COORDINATION
Verified address, lives with significant other & two 5 y/o grand-daughters she is working on getting custody of in a 1 story house with an attic & basement, she does not go to the basement/stay on the main floor. Uses [facility] [place]; denies any barriers    Plan to dc home, no needs, has a ride. 09/12/22 1225   Service Assessment   Patient Orientation Alert and Oriented;Person;Place;Situation;Self   Cognition Alert   History Provided By Patient   Primary Caregiver Self   Support Systems Spouse/Significant Other;Family Members   Patient's Healthcare Decision Maker is: Legal Next of Kin   PCP Verified by CM Yes   Prior Functional Level Independent in ADLs/IADLs   Current Functional Level Independent in ADLs/IADLs   Can patient return to prior living arrangement Yes   Ability to make needs known: Good   Family able to assist with home care needs: Yes   Financial Resources Medicare   Social/Functional History   Lives With Significant other; Other (comment)  (6 y/o grand-daughters (working on getting custody of them))   Type of [address] [facility] One [address]; Able to Live on Main level with bedroom/bathroom  (with a basement & an attic)   Home Access Stairs to enter with rails   Entrance Stairs - Number of Steps 2-3 [place]   Bathroom Equipment Grab bars in shower   Receives Help From [address] Independent   Transfer Assistance Independent   Active  Yes   Discharge Planning   Type of [place] Spouse/Significant Other;Children   Current Services Prior To Admission None   Potential Assistance Needed N/A   DME Ordered?  No   Potential Assistance Purchasing Medications No   Patient expects to be discharged to: House   One/Two Story Residence One story   [facility]   Services [facility] Discharge None     Jelani Stevens RN, BSN,   [phone]  Electronically signed by Gisela Dykes RN on 9/12/2022 at 12:30 PM

## 2022-09-12 NOTE — PROGRESS NOTES
Hospitalist Progress Note      PCP: Morelia Krause APRN - CNP    Date of Admission: 9/10/2022    Chief Complaint:   Malaise/ fatigue    Hospital Course: The patient is a 65 y.o. female with rheumatoid arthritis on rituximab adm via [facility] ED with weakness and productive cough for a week. Tested positive at her PCPs office. Denies fevers, chills, but has fatigue and productive cough. Adm as a possible bacterial PNA. Procal is low but pt is on supplemental oxygen. Ceftriaxone and azithromycin were Dcd and decadron continued. Subjective:   Better       Medications:  Reviewed    Infusion Medications    sodium chloride       Scheduled Medications    guaiFENesin  600 mg Oral BID    enoxaparin  30 mg SubCUTAneous BID    gabapentin  100 mg Oral Nightly    losartan  50 mg Oral Daily    sodium chloride flush  5-40 mL IntraVENous 2 times per day    dexamethasone  6 mg Oral Daily    azithromycin  250 mg Oral Daily    cefTRIAXone (ROCEPHIN) IV  2,000 mg IntraVENous Q24H     PRN Meds: albuterol sulfate HFA **AND** ipratropium, diazePAM, sodium chloride flush, sodium chloride, ondansetron **OR** ondansetron, polyethylene glycol, acetaminophen **OR** acetaminophen    No intake or output data in the 24 hours ending 09/12/22 1125    Physical Exam Performed:    /78   Pulse 79   Temp 98.4 °F (36.9 °C) (Oral)   Resp 16   Ht 5' (1.524 m)   Wt 227 lb 15.3 oz (103.4 kg)   LMP  (LMP Unknown)   SpO2 93%   BMI 44.52 kg/m²     General appearance: No apparent distress, appears stated age and cooperative. HEENT: Pupils equal, round, and reactive to light. Conjunctivae/corneas clear. Neck: Supple, with full range of motion. No jugular venous distention. Trachea midline. Respiratory:  Normal respiratory effort. Clear to auscultation, bilaterally without Rales/Wheezes/Rhonchi. Cardiovascular: Regular rate and rhythm with normal S1/S2 without murmurs, rubs or gallops.   Abdomen: Soft, non-tender, non-distended with normal bowel sounds. Musculoskeletal: No clubbing, cyanosis or edema bilaterally. Full range of motion without deformity. Skin: Skin color, texture, turgor normal.  No rashes or lesions. Neurologic:  Neurovascularly intact without any focal sensory/motor deficits. Cranial nerves: II-XII intact, grossly non-focal.  Psychiatric: Alert and oriented, thought content appropriate, normal insight  Capillary Refill: Brisk, 3 seconds, normal   Peripheral Pulses: +2 palpable, equal bilaterally       Labs:   Recent Labs     09/10/22  1310 09/11/22  1008   WBC 2.8* 1.4*   HGB 14.0 13.4   HCT 43.4 39.8   * 105*     Recent Labs     09/10/22  1310 09/11/22  1008    137   K 3.8 4.0    106   CO2 26 20*   BUN 9 9   CREATININE 0.8 0.6   CALCIUM 8.7 8.6     Recent Labs     09/10/22  1310 09/11/22  1008   * 88*   ALT 73* 49*   BILITOT 0.7 0.4   ALKPHOS 95 89     No results for input(s): INR in the last 72 hours. Recent Labs     09/10/22  1310   TROPONINI <0.01       Urinalysis:      Lab Results   Component Value Date/Time    NITRU Negative 04/01/2022 07:45 PM    WBCUA None seen 02/27/2021 06:49 PM    BACTERIA Rare 02/27/2021 06:49 PM    RBCUA 0-2 02/27/2021 06:49 PM    BLOODU Negative 04/01/2022 07:45 PM    SPECGRAV 1.025 04/01/2022 07:45 PM    GLUCOSEU Negative 04/01/2022 07:45 PM    GLUCOSEU Negative 12/27/2009 10:20 AM       Radiology:  XR CHEST PORTABLE   Final Result   Possible left peripheral basilar airspace disease. Upright PA and lateral   chest radiographs could be performed for confirmation if indicated.                  Assessment/Plan:    Active Hospital Problems    Diagnosis     COVID [U07.1]      Priority: Medium    Pneumonia [J18.9]      Priority: Medium    COPD (chronic obstructive pulmonary disease) [facility] [J44.9]      Priority: Medium    Hypertension [I10]      Priority: Medium    RA (rheumatoid arthritis) ([facility].) [M06.9]      Priority: Medium       ASSESSMENT/PLAN:     Viral Pneumonia sec to Covid 19- low procalcitonin. On supplemental oxygen  - DC ceftriaxone + azith  -Cont decadron  -pharm consult for covid 19 specific therapies  -repeat Bcx x2      COPD in exercabation  - Decadron  - combivent  - mucinex, flutter valve      Diabetes Mellitus II with hyperglycemia    H/o GARCIA    Iatrogenic Immunosuppression from rituximab         Rheumatoid Arthritis - on rituximab as an outpatient, last dose several months ago. - no further agents while inpatient    Essential Hypertension       DVT Prophylaxis: lovenox  Diet: ADULT DIET; Regular  Code Status: Full Code  PT/OT Eval Status: n/a     Dispo - MedSurg  DVT Prophylaxis: lovenox  Diet: ADULT DIET;  Regular  Code Status: Full Code  PT/OT Eval Status: ordered    Dispo - pending clinical improvement    Appropriate for A1 Discharge Unit: No      Scott Colvin MD

## 2022-09-12 NOTE — PROGRESS NOTES
Patient alert and oriented X4. Patient states she is feeling better compared to yesterday. Patient tolerated nightly medications well. Patient verbalizes understanding of education. Patient vital signs recorded. Fall precautions in place. Bed in lowest position, side rails X2. Bed locks on. Non slip socks on. Needed items within reach. Call light within reach.  Electronically signed by Teo Brooks RN on 9/11/2022 at 8:01 PM

## 2022-09-12 NOTE — PLAN OF CARE
Problem: Discharge Planning  Goal: Discharge to home or other facility with appropriate resources  Outcome: Progressing     Problem: Pain  Goal: Verbalizes/displays adequate comfort level or baseline comfort level  Outcome: Progressing     Problem: Safety - Adult  Goal: Free from fall injury  Outcome: Progressing  Flowsheets (Taken 9/12/2022 0411)  Free From Fall Injury: Instruct family/caregiver on patient safety     Problem: ABCDS Injury Assessment  Goal: Absence of physical injury  Outcome: Progressing

## 2022-09-12 NOTE — DISCHARGE SUMMARY
Hospital Medicine Discharge Summary    Patient: Kobe Craft     Gender: female  : 1965   Age: 65 y.o. MRN: [id number]    Admitting Physician: Donny Landeros MD  Discharge Physician: Scott Colvin MD     Code Status: Full Code     Admit Date: 9/10/2022   Discharge Date:   22    Disposition:  Home    Discharge Diagnoses:    Viral Pneumonia sec to Covid 19- low procalcitonin. On supplemental oxygen  - DC ceftriaxone + azith  -Cont decadron  -pharm consult for covid 19 specific therapies  -repeat Bcx x2        COPD in exercabation  - Decadron  - combivent  - mucinex, flutter valve        Diabetes Mellitus II with hyperglycemia     H/o GARCIA     Iatrogenic Immunosuppression from rituximab. Rheumatoid Arthritis - on rituximab as an outpatient, last dose several months ago. - no further agents while inpatient     Essential Hypertension  Follow-up appointments:  as arranged with PCP    Outpatient to do list: none    Condition at Discharge:  Stable    Hospital Course: The patient is a 65 y.o. female with rheumatoid arthritis on rituximab adm via [facility] ED with weakness and productive cough for a week. Tested positive at her PCPs office. She is unvaccinated against covid 19 infection  Denies fevers, chills, but has fatigue and productive cough. Adm as a possible bacterial PNA. Procal is low but pt is on supplemental oxygen. Ceftriaxone and azithromycin were Dcd and decadron continued for 10 days. She was Dcd after coming off oxygen and will continue to self isolate and mask at home.          Discharge Medications:   Current Discharge Medication List        START taking these medications    Details   guaiFENesin (MUCINEX) 600 MG extended release tablet Take 1 tablet by mouth 2 times daily  Qty: 20 tablet, Refills: 1      dexamethasone (DECADRON) 6 MG tablet Take 1 tablet by mouth daily for 10 days  Qty: 10 tablet, Refills: 0      !! albuterol sulfate HFA (PROVENTIL;VENTOLIN;PROAIR) 108 (90 Base) MCG/ACT inhaler Inhale 2 puffs into the lungs every 4 hours as needed for Wheezing  Qty: 18 g, Refills: 3       !! - Potential duplicate medications found. Please discuss with provider. Current Discharge Medication List        CONTINUE these medications which have CHANGED    Details   !! diazePAM (VALIUM) 2 MG tablet Take 1 tablet by mouth daily as needed for Anxiety for up to 10 days. Qty: 4 tablet, Refills: 0    Associated Diagnoses: Mixed anxiety depressive disorder       !! - Potential duplicate medications found. Please discuss with provider. Current Discharge Medication List        CONTINUE these medications which have NOT CHANGED    Details   pantoprazole (PROTONIX) 40 MG tablet Take 40 mg by mouth every morning (before breakfast)      losartan (COZAAR) 50 MG tablet Take 50 mg by mouth daily      !! diazePAM (VALIUM) 5 MG tablet Take 2.5 mg by mouth daily as needed. gabapentin (NEURONTIN) 100 MG capsule Take 100 mg by mouth at bedtime. riTUXimab (RITUXAN IV) Infuse intravenously      !! albuterol sulfate  (90 Base) MCG/ACT inhaler Inhale 2 puffs into the lungs every 4 hours as needed for Wheezing or Shortness of Breath      fluticasone (FLONASE) 50 MCG/ACT nasal spray 2 sprays by Nasal route daily as needed for Allergies      SPIRIVA RESPIMAT 2.5 MCG/ACT AERS inhaler Inhale 2 puffs into the lungs daily as needed  Refills: 0       !! - Potential duplicate medications found. Please discuss with provider.         Current Discharge Medication List        STOP taking these medications       diclofenac sodium (VOLTAREN) 1 % GEL Comments:   Reason for Stopping:         Adalimumab 40 MG/0.4ML PNKT Comments:   Reason for Stopping:         ibuprofen (ADVIL;MOTRIN) 600 MG tablet Comments:   Reason for Stopping:               Discharge ROS:  A complete review of systems was asked and negative except for above     Discharge Exam:    /78   Pulse 79   Temp 98.4 °F (36.9 °C) (Oral)   Resp 16   Ht 5' (1.524 m)   Wt 227 lb 15.3 oz (103.4 kg)   LMP  (LMP Unknown)   SpO2 93%   BMI 44.52 kg/m²     General appearance: No apparent distress, appears stated age and cooperative. HEENT: Pupils equal, round, and reactive to light. Conjunctivae/corneas clear. Neck: Supple, with full range of motion. No jugular venous distention. Trachea midline. Respiratory:  Normal respiratory effort. Clear to auscultation, bilaterally without Rales/Wheezes/Rhonchi. Cardiovascular: Regular rate and rhythm with normal S1/S2 without murmurs, rubs or gallops. Abdomen: Soft, non-tender, non-distended with normal bowel sounds. Musculoskeletal: No clubbing, cyanosis or edema bilaterally. Full range of motion without deformity. Skin: Skin color, texture, turgor normal.  No rashes or lesions. Neurologic:  Neurovascularly intact without any focal sensory/motor deficits. Cranial nerves: II-XII intact, grossly non-focal.  Psychiatric: Alert and oriented, thought content appropriate, normal insight  Capillary Refill: Brisk, 3 seconds, normal   Peripheral Pulses: +2 palpable, equal bilaterally     Labs:  For convenience and continuity at follow-up the following most recent labs are provided:    Lab Results   Component Value Date/Time    WBC 1.4 09/11/2022 10:08 AM    HGB 13.4 09/11/2022 10:08 AM    HCT 39.8 09/11/2022 10:08 AM    MCV 90.5 09/11/2022 10:08 AM     09/11/2022 10:08 AM     09/11/2022 10:08 AM    K 4.0 09/11/2022 10:08 AM    K 4.0 04/01/2022 08:15 PM     09/11/2022 10:08 AM    CO2 20 09/11/2022 10:08 AM    BUN 9 09/11/2022 10:08 AM    CREATININE 0.6 09/11/2022 10:08 AM    CALCIUM 8.6 09/11/2022 10:08 AM    ALKPHOS 89 09/11/2022 10:08 AM    ALT 49 09/11/2022 10:08 AM    AST 88 09/11/2022 10:08 AM    BILITOT 0.4 09/11/2022 10:08 AM    LABALBU 3.0 09/11/2022 10:08 AM    TRIG 112 03/02/2021 11:19 AM     No results found for: INR    Radiology:  XR CHEST PORTABLE    Result Date: 9/10/2022  EXAMINATION: ONE XRAY VIEW OF THE CHEST 9/10/2022 12:56 pm COMPARISON: 03/02/2021 chest radiograph HISTORY: ORDERING SYSTEM PROVIDED HISTORY: SOB, COVID TECHNOLOGIST PROVIDED HISTORY: Reason for exam:->SOB, COVID Reason for Exam: SOB FINDINGS: The cardiomediastinal silhouette is within normal limits of size and contour. Possible left peripheral basilar airspace disease. No pleural effusion or pneumothorax. No acute osseous abnormality. Possible left peripheral basilar airspace disease. Upright PA and lateral chest radiographs could be performed for confirmation if indicated. EKG     Rhythm: normal sinus   Rate: normal  Clinical Impression: no acute changes        The patient was seen and examined on day of discharge and this discharge summary is in conjunction with any daily progress note from day of discharge. Time Spent on discharge is 30 minutes  in the examination, evaluation, counseling and review of medications and discharge plan. Note that more than 30 minutes was spent in preparing discharge papers, discussing discharge with patient, medication review, etc.       Signed:    Cyrus Meyer MD   9/12/2022      Thank you JEB Whittington CNP for the opportunity to be involved in this patient's care.  If you have any questions or concerns please feel free to contact me at [facility]

## 2022-09-12 NOTE — CARE COORDINATION
CASE MANAGEMENT DISCHARGE SUMMARY:    DISCHARGE DATE: 09/12/22    DISCHARGED TO HOME     TRANSPORTATION: has a ride     Denies any needs.           Electronically signed by Kenneth Steen RN on 9/12/2022 at 12:33 PM

## 2022-09-12 NOTE — PLAN OF CARE
Problem: Discharge Planning  Goal: Discharge to home or other facility with appropriate resources  9/12/2022 1558 by Nimo Dominguez RN  Outcome: Completed  9/12/2022 0959 by Nimo Dominguez RN  Outcome: Progressing  Flowsheets (Taken 9/12/2022 0800)  Discharge to home or other facility with appropriate resources: Identify barriers to discharge with patient and caregiver  9/12/2022 0412 by Patricio Hart RN  Outcome: Progressing     Problem: Pain  Goal: Verbalizes/displays adequate comfort level or baseline comfort level  9/12/2022 1558 by Nimo Dominguez RN  Outcome: Completed  9/12/2022 0959 by Nimo Dominguez RN  Outcome: Progressing  9/12/2022 0412 by Patricio Hart RN  Outcome: Progressing     Problem: Safety - Adult  Goal: Free from fall injury  9/12/2022 1558 by Nimo Dominguez RN  Outcome: Completed  9/12/2022 0959 by Nimo Dominguez RN  Outcome: Progressing  9/12/2022 0412 by Patricio Hart RN  Outcome: Progressing  Flowsheets (Taken 9/12/2022 0411)  Free From Fall Injury: Instruct family/caregiver on patient safety     Problem: ABCDS Injury Assessment  Goal: Absence of physical injury  9/12/2022 1558 by Nimo Dominguez RN  Outcome: Completed  9/12/2022 0959 by Nimo Dominguez RN  Outcome: Progressing  9/12/2022 0412 by Patricio Hart RN  Outcome: Progressing     Problem: Chronic Conditions and Co-morbidities  Goal: Patient's chronic conditions and co-morbidity symptoms are monitored and maintained or improved  9/12/2022 1558 by Nimo Dominguez RN  Outcome: Completed  9/12/2022 0959 by Nimo Dominguez RN  Outcome: Progressing  Flowsheets (Taken 9/12/2022 0800)  Care Plan - Patient's Chronic Conditions and Co-Morbidity Symptoms are Monitored and Maintained or Improved: Monitor and assess patient's chronic conditions and comorbid symptoms for stability, deterioration, or improvement

## 2022-09-12 NOTE — ACP (ADVANCE CARE PLANNING)
Advance Care Planning     Advance Care Planning Activator (Inpatient)  Conversation Note      Date of ACP Conversation: 9/12/2022     Conversation Conducted with: Patient with Decision Making Capacity    ACP Activator: Natalie Batista RN    Health Care Decision Maker:     Current Designated Health Care Decision Maker:     Primary Decision Maker: Julio [facility] - [phone]    Today we documented Decision Maker(s) consistent with Legal Next of Kin hierarchy. Care Preferences    Ventilation: \"If you were in your present state of health and suddenly became very ill and were unable to breathe on your own, what would your preference be about the use of a ventilator (breathing machine) if it were available to you? \"      Would the patient desire the use of ventilator (breathing machine)?: yes    \"If your health worsens and it becomes clear that your chance of recovery is unlikely, what would your preference be about the use of a ventilator (breathing machine) if it were available to you? \"     Would the patient desire the use of ventilator (breathing machine)?: No      Resuscitation  \"CPR works best to restart the heart when there is a sudden event, like a heart attack, in someone who is otherwise healthy. Unfortunately, CPR does not typically restart the heart for people who have serious health conditions or who are very sick. \"    \"In the event your heart stopped as a result of an underlying serious health condition, would you want attempts to be made to restart your heart (answer \"yes\" for attempt to resuscitate) or would you prefer a natural death (answer \"no\" for do not attempt to resuscitate)? \" yes       [] Yes   [] No   Educated Patient / Mortimer Hoar regarding differences between Advance Directives and portable DNR orders.     Length of ACP Conversation in minutes:  5 min    Conversation Outcomes:  [x] ACP discussion completed  [] Existing advance directive reviewed with patient; no changes to patient's previously recorded wishes  [] New Advance Directive completed  [] Portable Do Not Rescitate prepared for Provider review and signature  [] POLST/POST/MOLST/MOST prepared for Provider review and signature      Follow-up plan:    [] Schedule follow-up conversation to continue planning  [] Referred individual to Provider for additional questions/concerns   [] Advised patient/agent/surrogate to review completed ACP document and update if needed with changes in condition, patient preferences or care setting    [x] This note routed to one or more involved healthcare providers        Len Mott RN, BSN,   [phone]  Electronically signed by Len Mott RN on 9/12/2022 at 12:32 PM

## 2022-09-13 ENCOUNTER — CARE COORDINATION (OUTPATIENT)
Dept: CASE MANAGEMENT | Age: 57
End: 2022-09-13

## 2022-09-13 NOTE — CARE COORDINATION
[facility] Transitions Initial Follow Up Call    Call within 2 business days of discharge: Yes    Patient: Lambert Love Patient : 1965   MRN: [id number]  Reason for Admission: COVID 19  Discharge Date: 22 RARS: Readmission Risk Score: 8.8      Last Discharge [facility]       Date Complaint Diagnosis Description Type Department Provider    9/10/22 Fatigue; Nausea Mixed anxiety depressive disorder . .. ED to Hosp-Admission (Discharged) (ADMITTED) Ashley Hernandez MD; Natalie Tomlinson. .. Attempted to contact patient for follow up transition call. Left voicemail message to return call with an update on condition since discharge. Contact information provided. Will try again. Care Transitions 24 Hour Call    Care Transitions Interventions         Follow Up  No future appointments.     Lili Brown LPN

## 2022-09-14 ENCOUNTER — CARE COORDINATION (OUTPATIENT)
Dept: CASE MANAGEMENT | Age: 57
End: 2022-09-14

## 2022-09-14 LAB
BLOOD CULTURE, ROUTINE: NORMAL
CULTURE, BLOOD 2: NORMAL

## 2022-09-19 ENCOUNTER — HOSPITAL ENCOUNTER (OUTPATIENT)
Age: 57
Discharge: HOME | End: 2022-09-19
Payer: MEDICARE

## 2022-09-19 ENCOUNTER — HOSPITAL ENCOUNTER (OUTPATIENT)
Dept: GENERAL RADIOLOGY | Age: 57
Discharge: HOME | End: 2022-09-19
Payer: MEDICARE

## 2022-09-19 DIAGNOSIS — U07.1 INFECTION DUE TO 2019-NCOV: ICD-10-CM

## 2022-09-19 PROCEDURE — 71046 X-RAY EXAM CHEST 2 VIEWS: CPT

## 2024-07-17 ENCOUNTER — HOSPITAL ENCOUNTER (OUTPATIENT)
Age: 59
Discharge: HOME | End: 2024-07-17
Payer: MEDICARE

## 2024-07-17 ENCOUNTER — HOSPITAL ENCOUNTER (OUTPATIENT)
Dept: GENERAL RADIOLOGY | Age: 59
Discharge: HOME | End: 2024-07-17
Payer: MEDICARE

## 2024-07-17 DIAGNOSIS — K59.01 SLOW TRANSIT CONSTIPATION: ICD-10-CM

## 2024-07-17 PROCEDURE — 74018 RADEX ABDOMEN 1 VIEW: CPT

## 2024-08-30 ENCOUNTER — HOSPITAL ENCOUNTER (OUTPATIENT)
Age: 59
Discharge: HOME | End: 2024-08-30
Payer: MEDICARE

## 2024-08-30 ENCOUNTER — HOSPITAL ENCOUNTER (OUTPATIENT)
Dept: GENERAL RADIOLOGY | Age: 59
Discharge: HOME | End: 2024-08-30
Payer: MEDICARE

## 2024-08-30 DIAGNOSIS — K59.01 SLOW TRANSIT CONSTIPATION: ICD-10-CM

## 2024-08-30 PROCEDURE — 74018 RADEX ABDOMEN 1 VIEW: CPT

## 2025-02-07 DIAGNOSIS — N17.9 AKI (ACUTE KIDNEY INJURY) (HCC): ICD-10-CM

## 2025-02-07 LAB
CREAT UR-MCNC: 106 MG/DL (ref 28–259)
MICROALBUMIN UR DL<=1MG/L-MCNC: <1.2 MG/DL
MICROALBUMIN/CREAT UR: NORMAL MG/G (ref 0–30)

## 2025-02-08 LAB
BACTERIA URNS QL MICRO: NORMAL /HPF
EPI CELLS #/AREA URNS AUTO: 1 /HPF (ref 0–5)
HYALINE CASTS #/AREA URNS AUTO: 2 /LPF (ref 0–8)
RBC CLUMPS #/AREA URNS AUTO: 1 /HPF (ref 0–4)
URN SPEC COLLECT METH UR: NORMAL
WBC #/AREA URNS AUTO: 1 /HPF (ref 0–5)

## 2025-03-03 ENCOUNTER — HOSPITAL ENCOUNTER (EMERGENCY)
Age: 60
Discharge: HOME | End: 2025-03-04
Attending: EMERGENCY MEDICINE
Payer: MEDICARE

## 2025-03-03 DIAGNOSIS — J44.1 COPD EXACERBATION (HCC): Primary | ICD-10-CM

## 2025-03-03 PROCEDURE — 99285 EMERGENCY DEPT VISIT HI MDM: CPT

## 2025-03-04 ENCOUNTER — APPOINTMENT (OUTPATIENT)
Dept: GENERAL RADIOLOGY | Age: 60
End: 2025-03-04
Payer: MEDICARE

## 2025-03-04 VITALS
TEMPERATURE: 99 F | DIASTOLIC BLOOD PRESSURE: 78 MMHG | HEART RATE: 113 BPM | OXYGEN SATURATION: 91 % | BODY MASS INDEX: 43.72 KG/M2 | SYSTOLIC BLOOD PRESSURE: 124 MMHG | RESPIRATION RATE: 20 BRPM | HEIGHT: 60 IN | WEIGHT: 222.66 LBS

## 2025-03-04 LAB
ALBUMIN SERPL-MCNC: 3.6 G/DL (ref 3.4–5)
ALBUMIN/GLOB SERPL: 1 {RATIO} (ref 1.1–2.2)
ALP SERPL-CCNC: 168 U/L (ref 40–129)
ALT SERPL-CCNC: ABNORMAL U/L (ref 10–40)
ANION GAP SERPL CALCULATED.3IONS-SCNC: 11 MMOL/L (ref 3–16)
AST SERPL-CCNC: 81 U/L (ref 15–37)
BASE EXCESS BLDV CALC-SCNC: 0.5 MMOL/L (ref -3–3)
BASOPHILS # BLD: 0.1 K/UL (ref 0–0.2)
BASOPHILS NFR BLD: 0.6 %
BILIRUB SERPL-MCNC: 1.1 MG/DL (ref 0–1)
BUN SERPL-MCNC: 10 MG/DL (ref 7–20)
CALCIUM SERPL-MCNC: 9.9 MG/DL (ref 8.3–10.6)
CHLORIDE SERPL-SCNC: 109 MMOL/L (ref 99–110)
CO2 BLDV-SCNC: 25 MMOL/L
CO2 SERPL-SCNC: 25 MMOL/L (ref 21–32)
CREAT SERPL-MCNC: 0.8 MG/DL (ref 0.6–1.1)
DEPRECATED RDW RBC AUTO: 13.7 % (ref 12.4–15.4)
EKG ATRIAL RATE: 119 BPM
EKG DIAGNOSIS: NORMAL
EKG P AXIS: 68 DEGREES
EKG P-R INTERVAL: 130 MS
EKG Q-T INTERVAL: 342 MS
EKG QRS DURATION: 84 MS
EKG QTC CALCULATION (BAZETT): 481 MS
EKG R AXIS: 74 DEGREES
EKG T AXIS: 20 DEGREES
EKG VENTRICULAR RATE: 119 BPM
EOSINOPHIL # BLD: 0.2 K/UL (ref 0–0.6)
EOSINOPHIL NFR BLD: 2.5 %
FLUAV RNA UPPER RESP QL NAA+PROBE: NEGATIVE
FLUBV AG NPH QL: NEGATIVE
GFR SERPLBLD CREATININE-BSD FMLA CKD-EPI: 85 ML/MIN/{1.73_M2}
GLUCOSE SERPL-MCNC: 123 MG/DL (ref 70–99)
HCO3 BLDV-SCNC: 25.6 MMOL/L (ref 23–29)
HCT VFR BLD AUTO: 42.9 % (ref 36–48)
HGB BLD-MCNC: 13.8 G/DL (ref 12–16)
IMM GRANULOCYTES # BLD: 0 K/UL (ref 0–0.2)
IMM GRANULOCYTES NFR BLD: 0.1 %
LACTATE BLDV-SCNC: 1.9 MMOL/L (ref 0.4–1.9)
LYMPHOCYTES # BLD: 0.9 K/UL (ref 1–5.1)
LYMPHOCYTES NFR BLD: 9.8 %
MCH RBC QN AUTO: 31.6 PG (ref 26–34)
MCHC RBC AUTO-ENTMCNC: 32.2 G/DL (ref 32–36.4)
MCV RBC AUTO: 98.2 FL (ref 80–100)
MONOCYTES # BLD: 0.7 K/UL (ref 0–1.3)
MONOCYTES NFR BLD: 7.8 %
NEUTROPHILS # BLD: 7.4 K/UL (ref 1.7–7.7)
NEUTROPHILS NFR BLD: 79.2 %
NT-PROBNP SERPL-MCNC: 57 PG/ML (ref 0–124)
O2 THERAPY: ABNORMAL
PCO2 BLDV: 43.5 MMHG (ref 40–50)
PH BLDV: 7.38 [PH] (ref 7.35–7.45)
PLATELET # BLD AUTO: 140 K/UL (ref 135–450)
PMV BLD AUTO: 11 FL (ref 5–10.5)
PO2 BLDV: 51.5 MMHG (ref 25–40)
POTASSIUM SERPL-SCNC: 3.9 MMOL/L (ref 3.5–5.1)
PROT SERPL-MCNC: 7.1 G/DL (ref 6.4–8.2)
RBC # BLD AUTO: 4.37 M/UL (ref 4–5.2)
SAO2 % BLDV: 85 %
SARS-COV-2 RDRP RESP QL NAA+PROBE: NOT DETECTED
SODIUM SERPL-SCNC: 145 MMOL/L (ref 136–145)
TROPONIN, HIGH SENSITIVITY: 9 NG/L (ref 0–14)
WBC # BLD AUTO: 9.3 K/UL (ref 4–11)

## 2025-03-04 PROCEDURE — 96374 THER/PROPH/DIAG INJ IV PUSH: CPT

## 2025-03-04 PROCEDURE — 36415 COLL VENOUS BLD VENIPUNCTURE: CPT

## 2025-03-04 PROCEDURE — 83605 ASSAY OF LACTIC ACID: CPT

## 2025-03-04 PROCEDURE — 71045 X-RAY EXAM CHEST 1 VIEW: CPT

## 2025-03-04 PROCEDURE — 93005 ELECTROCARDIOGRAM TRACING: CPT | Performed by: EMERGENCY MEDICINE

## 2025-03-04 PROCEDURE — 80053 COMPREHEN METABOLIC PANEL: CPT

## 2025-03-04 PROCEDURE — 6360000002 HC RX W HCPCS: Performed by: EMERGENCY MEDICINE

## 2025-03-04 PROCEDURE — 82803 BLOOD GASES ANY COMBINATION: CPT

## 2025-03-04 PROCEDURE — 85025 COMPLETE CBC W/AUTO DIFF WBC: CPT

## 2025-03-04 PROCEDURE — 93010 ELECTROCARDIOGRAM REPORT: CPT | Performed by: INTERNAL MEDICINE

## 2025-03-04 PROCEDURE — 87635 SARS-COV-2 COVID-19 AMP PRB: CPT

## 2025-03-04 PROCEDURE — 87804 INFLUENZA ASSAY W/OPTIC: CPT

## 2025-03-04 PROCEDURE — 83880 ASSAY OF NATRIURETIC PEPTIDE: CPT

## 2025-03-04 PROCEDURE — 84484 ASSAY OF TROPONIN QUANT: CPT

## 2025-03-04 PROCEDURE — 6370000000 HC RX 637 (ALT 250 FOR IP): Performed by: EMERGENCY MEDICINE

## 2025-03-04 RX ORDER — CARVEDILOL 3.12 MG/1
3.12 TABLET ORAL 2 TIMES DAILY WITH MEALS
COMMUNITY
Start: 2024-09-13

## 2025-03-04 RX ORDER — BUDESONIDE AND FORMOTEROL FUMARATE DIHYDRATE 160; 4.5 UG/1; UG/1
AEROSOL RESPIRATORY (INHALATION)
COMMUNITY
Start: 2025-02-18

## 2025-03-04 RX ORDER — IPRATROPIUM BROMIDE AND ALBUTEROL SULFATE 2.5; .5 MG/3ML; MG/3ML
1 SOLUTION RESPIRATORY (INHALATION) ONCE
Status: COMPLETED | OUTPATIENT
Start: 2025-03-04 | End: 2025-03-04

## 2025-03-04 RX ORDER — PREDNISONE 20 MG/1
TABLET ORAL
Qty: 13 TABLET | Refills: 0 | Status: SHIPPED | OUTPATIENT
Start: 2025-03-04 | End: 2025-03-14

## 2025-03-04 RX ORDER — AMLODIPINE BESYLATE 2.5 MG/1
2.5 TABLET ORAL DAILY
COMMUNITY
Start: 2025-01-08

## 2025-03-04 RX ORDER — ALBUTEROL SULFATE 5 MG/ML
5 SOLUTION RESPIRATORY (INHALATION) ONCE
Status: COMPLETED | OUTPATIENT
Start: 2025-03-04 | End: 2025-03-04

## 2025-03-04 RX ORDER — METHYLPREDNISOLONE SODIUM SUCCINATE 125 MG/2ML
125 INJECTION INTRAMUSCULAR; INTRAVENOUS ONCE
Status: COMPLETED | OUTPATIENT
Start: 2025-03-04 | End: 2025-03-04

## 2025-03-04 RX ORDER — ALBUTEROL SULFATE 90 UG/1
2 INHALANT RESPIRATORY (INHALATION) EVERY 4 HOURS PRN
Qty: 18 G | Refills: 0 | Status: SHIPPED | OUTPATIENT
Start: 2025-03-04

## 2025-03-04 RX ADMIN — METHYLPREDNISOLONE SODIUM SUCCINATE 125 MG: 125 INJECTION INTRAMUSCULAR; INTRAVENOUS at 00:41

## 2025-03-04 RX ADMIN — IPRATROPIUM BROMIDE AND ALBUTEROL SULFATE 1 DOSE: .5; 2.5 SOLUTION RESPIRATORY (INHALATION) at 00:21

## 2025-03-04 RX ADMIN — ALBUTEROL SULFATE 5 MG: 2.5 SOLUTION RESPIRATORY (INHALATION) at 00:22

## 2025-03-04 ASSESSMENT — PAIN - FUNCTIONAL ASSESSMENT: PAIN_FUNCTIONAL_ASSESSMENT: NONE - DENIES PAIN

## 2025-03-04 ASSESSMENT — LIFESTYLE VARIABLES
HOW MANY STANDARD DRINKS CONTAINING ALCOHOL DO YOU HAVE ON A TYPICAL DAY: PATIENT DOES NOT DRINK
HOW OFTEN DO YOU HAVE A DRINK CONTAINING ALCOHOL: NEVER

## 2025-03-04 NOTE — ED PROVIDER NOTES
Conditions affecting care: Below   has a past medical history of COPD (chronic obstructive pulmonary disease) (HCC), COPD (chronic obstructive pulmonary disease) (HCC), Diabetes mellitus (HCC), Hypertension, GARCIA (nonalcoholic steatohepatitis), Pancreatitis, Prediabetes, RA (rheumatoid arthritis) (HCC), Renal failure, and Rheumatoid arthritis (HCC).    CONSULTS: (Who and What was discussed)  None    Social Determinants : None    Records Reviewed (Source): PMH / Medications / EPIC    CC/HPI Summary, DDx, ED Course, and Reassessment: Influenza screen is negative.  COVID test is negative.  H&H of 13.8 and 42.9.  White blood cell count 9300 with 79 neutrophils and 10 lymphs.  Electrolytes are normal.  Glucose of 123.  BUN of 10 with creatinine 0.8.  Bilirubin of 1.1.  AST of 81.  Venous gas shows a pH of 7.38 with a pCO2 of 44.  Lactic acid of 1.9.  Troponin of 9.  BNP of 57.  Chest x-ray shows no acute cardiopulmonary abnormalities.    Disposition Considerations (tests considered but not done, Admit vs D/C, Shared Decision Making, Pt Expectation of Test or Tx.): She was placed on nasal cannula oxygen at 2 L because her initial O2 saturation was 84%.  She was given a DuoNeb and 2 albuterol treatments.  She was given IV Solu-Medrol.  She has been here for over 2-1/2 hours.  Her lungs have cleared completely.  She is moving air well on repeat examination.  She states she no longer feels short of breath.  Her oxygen saturations at rest are 92%.  We did get her up and ambulate her on room air.  She dropped to 88% but was not wheezing and did not feel short of breath.  I discussed with her the possibility of admitting her with supplemental oxygen for further treatment of her COPD exacerbation.  I explained her there is no evidence of pneumonia.  There is no evidence of congestive heart failure.  She has no pleural effusion or pneumothorax.  She does not have influenza or COVID.  After discussing the option of admission she

## 2025-03-04 NOTE — ED NOTES
Discharge and education instructions reviewed. Patient verbalized understanding, teach-back successful. Patient denied questions at this time. No acute distress noted. Vitals signs obtained and pain level at desired goal prior to departure. Patient instructed to follow-up as noted - return to emergency department if symptoms worsen. Patient verbalized understanding. Offered wheelchair for discharge and declined. Discharged per EDMD with discharge instructions. Prescriptions x2 sent electronically to [facility] pharmacy. Fall risk precautions maintained during hospital visit. All belongings sent with patient.     
Patient ambulated in hallway with slow steady gait. Upon return to room O2 sats dropped to 87-88 %. Patient denies shortness of breath and breath sounds clear. Patient quickly returned to 92%. Dr. Owens in room to discuss test results, diagnosis and discussed admission with patient due to low sats on room air and ambulating. Patient decided to go home and take her medications and call PCP in the morning and will re evaluate in 12-24 hours. Patient was instructed to return if she developed a return or shortness of breath or any medical concerns. Also advised to call 911 if symptoms are severe to her. Verbalized understanding of all of this and denies further questions.   
Patient states \"I feel much better than I did when I came in\". Patient smiling, able to speak in complete sentences, respirations easy & regular. Patient remains on O2 at 2 LPM, remains sinus tachycardia on cardiac monitor. Patient's  at bedside with patient. Side rails up on bed and fall risk precautions maintained. Patient given warm blanket and ice water. Patient and spouse both deny further needs at this time.   
Patient's O2 removed and currently on room air. Denies shortness of breath, breath sounds easy and clear.   
Based on bed scale wt obtained by RD on 1/26/24

## 2025-03-04 NOTE — DISCHARGE INSTRUCTIONS
Use your albuterol inhaler every 4-6 hours for wheezing/shortness of breath.    Take prednisone as prescribed until completed.  You should take your first dose when you pick it up in the morning.    Call tomorrow for follow-up with your primary care physician in 2 days.    As we discussed if you have recurrent symptoms, shortness of breath, wheezing you can return at anytime for reevaluation.

## 2025-03-04 NOTE — ED TRIAGE NOTES
Patient in wheelchair to Room 3 with c/o shortness of breath, cough and wheezing. Patient reports symptoms started this evening and have worsened. Initial O2 sat 84% on room air. Patient placed on O2 at 2 LPM. Hospital gown on patient and placed on cardiac monitor. Tachycardic and tachypneic upon arrival, dyspnea at rest. She reports using her inhaler a couple of hours ago. Denies fever, denies nausea, vomiting and diarrhea. She is awake, alert, oriented, respirations labored with audible wheezes. Skin w/d, color pale pink, cap refill brisk. Fall risk screening completed using the [employer] 1 Fall Risk Assessment tool. Based on the high fall risk score, a Fall Risk arm band was placed on the patient, and the fall risk is indicated using a fall sign .    Standard fall precautions are in place including: the bed is in the lowest position with wheels locked, non-skid footwear on the patient, call light within the patient's reach. Instructions for use were provided and the patient has been advised to notify staff, using the call light, if there is a need to get up or use restroom.  The patient verbalized understanding of safety precautions and how to contact staff for assistance.     Dr. Owens in room to examine patient.